# Patient Record
Sex: FEMALE | Race: WHITE | NOT HISPANIC OR LATINO | Employment: OTHER | URBAN - METROPOLITAN AREA
[De-identification: names, ages, dates, MRNs, and addresses within clinical notes are randomized per-mention and may not be internally consistent; named-entity substitution may affect disease eponyms.]

---

## 2019-02-05 ENCOUNTER — OFFICE VISIT (OUTPATIENT)
Dept: PAIN MEDICINE | Facility: CLINIC | Age: 54
End: 2019-02-05
Payer: MEDICAID

## 2019-02-05 ENCOUNTER — HOSPITAL ENCOUNTER (OUTPATIENT)
Dept: RADIOLOGY | Facility: HOSPITAL | Age: 54
Discharge: HOME | End: 2019-02-05
Attending: ANESTHESIOLOGY
Payer: MEDICAID

## 2019-02-05 VITALS
WEIGHT: 293 LBS | DIASTOLIC BLOOD PRESSURE: 89 MMHG | HEIGHT: 62 IN | SYSTOLIC BLOOD PRESSURE: 159 MMHG | HEART RATE: 93 BPM | BODY MASS INDEX: 53.92 KG/M2

## 2019-02-05 DIAGNOSIS — M17.0 PRIMARY OSTEOARTHRITIS OF BOTH KNEES: ICD-10-CM

## 2019-02-05 DIAGNOSIS — M19.019 SHOULDER ARTHRITIS: ICD-10-CM

## 2019-02-05 DIAGNOSIS — M47.816 LUMBAR SPONDYLOSIS: Primary | ICD-10-CM

## 2019-02-05 DIAGNOSIS — M47.816 LUMBAR FACET ARTHROPATHY: ICD-10-CM

## 2019-02-05 DIAGNOSIS — E66.01 MORBID OBESITY: ICD-10-CM

## 2019-02-05 DIAGNOSIS — M47.816 LUMBAR SPONDYLOSIS: ICD-10-CM

## 2019-02-05 PROCEDURE — 99999 PR PBB SHADOW E&M-NEW PATIENT-LVL III: ICD-10-PCS | Mod: PBBFAC,,, | Performed by: ANESTHESIOLOGY

## 2019-02-05 PROCEDURE — 72110 X-RAY EXAM L-2 SPINE 4/>VWS: CPT | Mod: TC

## 2019-02-05 PROCEDURE — 99204 OFFICE O/P NEW MOD 45 MIN: CPT | Mod: S$PBB,,, | Performed by: ANESTHESIOLOGY

## 2019-02-05 PROCEDURE — 72110 XR LUMBAR SPINE 5 VIEW WITH FLEX AND EXT: ICD-10-PCS | Mod: 26,,, | Performed by: RADIOLOGY

## 2019-02-05 PROCEDURE — 99999 PR PBB SHADOW E&M-NEW PATIENT-LVL III: CPT | Mod: PBBFAC,,, | Performed by: ANESTHESIOLOGY

## 2019-02-05 PROCEDURE — 99203 OFFICE O/P NEW LOW 30 MIN: CPT | Mod: PBBFAC,25,PN | Performed by: ANESTHESIOLOGY

## 2019-02-05 PROCEDURE — 73562 X-RAY EXAM OF KNEE 3: CPT | Mod: 26,50,, | Performed by: RADIOLOGY

## 2019-02-05 PROCEDURE — 73562 XR KNEE ORTHO BILAT: ICD-10-PCS | Mod: 26,50,, | Performed by: RADIOLOGY

## 2019-02-05 PROCEDURE — 72110 X-RAY EXAM L-2 SPINE 4/>VWS: CPT | Mod: 26,,, | Performed by: RADIOLOGY

## 2019-02-05 PROCEDURE — 73562 X-RAY EXAM OF KNEE 3: CPT | Mod: TC,50

## 2019-02-05 PROCEDURE — 99204 PR OFFICE/OUTPT VISIT, NEW, LEVL IV, 45-59 MIN: ICD-10-PCS | Mod: S$PBB,,, | Performed by: ANESTHESIOLOGY

## 2019-02-05 RX ORDER — METHOCARBAMOL 500 MG/1
500 TABLET, FILM COATED ORAL 2 TIMES DAILY PRN
Qty: 60 TABLET | Refills: 0 | Status: SHIPPED | OUTPATIENT
Start: 2019-02-05 | End: 2019-02-15

## 2019-02-05 NOTE — PROGRESS NOTES
Chief Pain Complaint:  Knee Pain; Low-back Pain; and Shoulder Pain        History of Present Illness:   Cee Mora is a 53 y.o. female  who is presenting with a chief complaint of Knee Pain; Low-back Pain; and Shoulder Pain  . The patient began experiencing this problem insidiously, and the pain has been gradually worsening over the past 5 year(s). The pain is described as throbbing, cramping, aching and heavy and is located in the bilateral lumbar spine . Pain is intermittent and lasts hours. The  pain is nonradiating. The patient rates her pain a 7 out of ten and interferes with activities of daily living a 6 out of ten. Pain is exacerbated by prolonged standing, and is improved by rest. Patient reports prior trauma, no prior spinal surgery     - pertinent negatives: No fever, No chills, No weight loss, No bladder dysfunction, No bowel dysfunction, No saddle anesthesia  - pertinent positives: generalized nonspecific Lower Extremity weakness bilaterally    - medications, other therapies tried (physical therapy, injections):     >> NSAIDs, Tylenol, Tramadol, Norco, Percocet, morphine, gabapentin and flexeril    >> Has previously undergone Physical Therapy    >> Has NOT previously undergone spinal injection/s      Imaging / Labs / Studies (reviewed on 2/5/2019):    Review of Systems:  CONSTITUTIONAL: patient denies any fever, chills, or weight loss  SKIN: patient denies any rash or itching  RESPIRATORY: patient denies having any shortness of breath  GASTROINTESTINAL: patient denies having any diarrhea, constipation, or bowel incontinence  GENITOURINARY: patient denies having any abnormal bladder function    MUSCULOSKELETAL:  - patient complains of the above noted pain/s (see chief pain complaint)    NEUROLOGICAL:   - pain as above  - strength in Lower extremities is intact, BILATERALLY  - sensation in Lower extremities is intact, BILATERALLY  - patient denies any loss of bowel or bladder control     "  PSYCHIATRIC: patient denies any change in mood    Other:  All other systems reviewed and are negative      Physical Exam:  BP (!) 159/89 (BP Location: Left arm, Patient Position: Sitting)   Pulse 93   Ht 5' 2" (1.575 m)   Wt (!) 242.7 kg (535 lb)   LMP  (LMP Unknown)   BMI 97.85 kg/m²  (reviewed on 2/5/2019)  General: Alert and oriented, in no apparent distress.  Gait: normal gait.  Skin: No rashes, No discoloration, No obvious lesions  HEENT: Normocephalic, atraumatic. Pupils equal and round.  Cardiovascular: Regular rate and rhythm , no significant peripheral edema present  Respiratory: Without audible wheezing, without use of accessory muscles of respiration.    Musculoskeletal:    Lumbar Spine    - Pain on flexion of lumbar spine Absent  - Straight Leg Raise:  Absent    - Pain on extension of lumbar spine Present  - TTP over the lumbar facet joints Present L5-S1   - Lumbar facet loading Present    -Pain on palpation over the SI joint  Absent  - CEM: Absent      Neuro:    Strength:  LE R/L: HF: 5/5, HE: 5/5, KF: 5/5; KE: 5/5; FE: 5/5; FF: 5/5    Extremity Reflexes: Brisk and symmetric throughout.      Extremity Sensory: Sensation to pinprick and temperature symmetric. Proprioception intact.      Psych:  Mood and affect is appropriate      Assessment:    Cee Mora is a 53 y.o. year old female who is presenting with     Encounter Diagnoses   Name Primary?    Lumbar spondylosis Yes    Lumbar facet arthropathy     Primary osteoarthritis of both knees        Plan:    1. Interventional: None for now.    2. Pharmacologic: Patient has been weaned of Morphine 30 mg PO Q day and Percocet.  Discourage the use of opioid pain medication for chronic aixal back pain. Compound cream prescribed for her wrist,  hands, knees. Robaxin 500 mg Po BID PRN pain.    3. Rehabilitative: Encouraged PT with pool therapy but patient has limited income.     4. Diagnostic: Lumbar Xray. Knee Xray.     5. Follow up: Follow-up " in about 4 weeks (around 3/5/2019).      20 minutes were spent in this encounter with more than 50% of the time used for counseling and review of the plan.  Imaging / studies reviewed, detailed above.  I discussed in detail the risks, benefits, and alternatives to any and all potential treatment options.  All questions and concerns were fully addressed today in clinic. Medical decision making moderate.    Thank you for the opportunity to assist in the care of this patient.    Best wishes,    Signed:    Desean Suero MD          Disclaimer:  This note may have been prepared using voice recognition software, it may have not been extensively proofed, as such there could be errors within the text such as sound alike errors.

## 2019-03-05 ENCOUNTER — OFFICE VISIT (OUTPATIENT)
Dept: PAIN MEDICINE | Facility: CLINIC | Age: 54
End: 2019-03-05
Payer: MEDICAID

## 2019-03-05 VITALS
BODY MASS INDEX: 53.92 KG/M2 | HEART RATE: 77 BPM | SYSTOLIC BLOOD PRESSURE: 163 MMHG | HEIGHT: 62 IN | DIASTOLIC BLOOD PRESSURE: 89 MMHG | WEIGHT: 293 LBS

## 2019-03-05 DIAGNOSIS — E11.42 DIABETIC POLYNEUROPATHY ASSOCIATED WITH TYPE 2 DIABETES MELLITUS: ICD-10-CM

## 2019-03-05 DIAGNOSIS — M47.816 LUMBAR SPONDYLOSIS: Primary | ICD-10-CM

## 2019-03-05 DIAGNOSIS — M47.816 LUMBAR FACET ARTHROPATHY: ICD-10-CM

## 2019-03-05 DIAGNOSIS — M17.0 PRIMARY OSTEOARTHRITIS OF BOTH KNEES: ICD-10-CM

## 2019-03-05 DIAGNOSIS — E66.01 MORBID OBESITY: ICD-10-CM

## 2019-03-05 PROCEDURE — 99999 PR PBB SHADOW E&M-EST. PATIENT-LVL II: ICD-10-PCS | Mod: PBBFAC,,, | Performed by: ANESTHESIOLOGY

## 2019-03-05 PROCEDURE — 99214 OFFICE O/P EST MOD 30 MIN: CPT | Mod: S$PBB,,, | Performed by: ANESTHESIOLOGY

## 2019-03-05 PROCEDURE — 99212 OFFICE O/P EST SF 10 MIN: CPT | Mod: PBBFAC,PN | Performed by: ANESTHESIOLOGY

## 2019-03-05 PROCEDURE — 99214 PR OFFICE/OUTPT VISIT, EST, LEVL IV, 30-39 MIN: ICD-10-PCS | Mod: S$PBB,,, | Performed by: ANESTHESIOLOGY

## 2019-03-05 PROCEDURE — 99999 PR PBB SHADOW E&M-EST. PATIENT-LVL II: CPT | Mod: PBBFAC,,, | Performed by: ANESTHESIOLOGY

## 2019-03-05 RX ORDER — GABAPENTIN 600 MG/1
TABLET ORAL
Qty: 120 TABLET | Refills: 2 | Status: SHIPPED | OUTPATIENT
Start: 2019-03-05 | End: 2019-06-10 | Stop reason: SDUPTHER

## 2019-03-05 RX ORDER — LIDOCAINE 50 MG/G
OINTMENT TOPICAL
Qty: 2 TUBE | Refills: 2 | Status: SHIPPED | OUTPATIENT
Start: 2019-03-05

## 2019-03-05 RX ORDER — METHOCARBAMOL 750 MG/1
750 TABLET, FILM COATED ORAL 2 TIMES DAILY PRN
Qty: 60 TABLET | Refills: 2 | Status: SHIPPED | OUTPATIENT
Start: 2019-03-05 | End: 2019-04-04

## 2019-03-05 NOTE — PROGRESS NOTES
Chief Pain Complaint:  Low-back Pain; Shoulder Pain; and Knee Pain    Interval History: The patient was last seen 2/5/2019. At that visit the plan was to start compound cream and Robaxin.  The patient reports that she is/was better following the procedure. The changes lasted 4 weeks. The changes have continued through this visit.      History of Present Illness:   Cee Mora is a 53 y.o. female  who is presenting with a chief complaint of Low-back Pain; Shoulder Pain; and Knee Pain  . The patient began experiencing this problem insidiously, and the pain has been gradually worsening over the past 5 year(s). The pain is described as throbbing, cramping, aching and heavy and is located in the bilateral lumbar spine . Pain is intermittent and lasts hours. The  pain is nonradiating. The patient rates her pain a 7 out of ten and interferes with activities of daily living a 6 out of ten. Pain is exacerbated by prolonged standing, and is improved by rest. Patient reports prior trauma, no prior spinal surgery     - pertinent negatives: No fever, No chills, No weight loss, No bladder dysfunction, No bowel dysfunction, No saddle anesthesia  - pertinent positives: generalized nonspecific Lower Extremity weakness bilaterally    - medications, other therapies tried (physical therapy, injections):     >> NSAIDs, Tylenol, Tramadol, Norco, Percocet, morphine, gabapentin and flexeril    >> Has previously undergone Physical Therapy    >> Has NOT previously undergone spinal injection/s      Imaging / Labs / Studies (reviewed on 3/5/2019):    Review of Systems:  CONSTITUTIONAL: patient denies any fever, chills, or weight loss  SKIN: patient denies any rash or itching  RESPIRATORY: patient denies having any shortness of breath  GASTROINTESTINAL: patient denies having any diarrhea, constipation, or bowel incontinence  GENITOURINARY: patient denies having any abnormal bladder function    MUSCULOSKELETAL:  - patient complains of the  "above noted pain/s (see chief pain complaint)    NEUROLOGICAL:   - pain as above  - strength in Lower extremities is intact, BILATERALLY  - sensation in Lower extremities is intact, BILATERALLY  - patient denies any loss of bowel or bladder control      PSYCHIATRIC: patient denies any change in mood    Other:  All other systems reviewed and are negative      Physical Exam:  BP (!) 163/89 (BP Location: Right arm, Patient Position: Sitting)   Pulse 77   Ht 5' 2" (1.575 m)   Wt (!) 242.7 kg (535 lb)   LMP  (LMP Unknown)   BMI 97.85 kg/m²  (reviewed on 3/5/2019)  General: Alert and oriented, in no apparent distress.  Gait: normal gait.  Skin: No rashes, No discoloration, No obvious lesions  HEENT: Normocephalic, atraumatic. Pupils equal and round.  Cardiovascular: Regular rate and rhythm , no significant peripheral edema present  Respiratory: Without audible wheezing, without use of accessory muscles of respiration.    Musculoskeletal:    Lumbar Spine    - Pain on flexion of lumbar spine Absent  - Straight Leg Raise:  Absent    - Pain on extension of lumbar spine Present  - TTP over the lumbar facet joints Present L5-S1   - Lumbar facet loading Present    -Pain on palpation over the SI joint  Absent  - CEM: Absent      Neuro:    Strength:  LE R/L: HF: 5/5, HE: 5/5, KF: 5/5; KE: 5/5; FE: 5/5; FF: 5/5    Extremity Reflexes: Brisk and symmetric throughout.      Extremity Sensory: Sensation to pinprick and temperature symmetric. Proprioception intact.      Psych:  Mood and affect is appropriate      Assessment:    Cee Mora is a 53 y.o. year old female who is presenting with     Encounter Diagnoses   Name Primary?    Lumbar spondylosis Yes    Lumbar facet arthropathy     Primary osteoarthritis of both knees     Morbid obesity     Diabetic polyneuropathy associated with type 2 diabetes mellitus        Plan:    1. Interventional: None for now.    2. Pharmacologic: Patient has been currently being weaning " down successfully from Morphine 30 mg PO BID and Percocet.  Discourage the use of opioid pain medication for chronic aixal back pain. Compound cream prescribed for her wrist,  hands, knees this is helping a great deal. Increase Robaxin 500 to 750 mg Po BID PRN pain. She is tolerating well. Increase Gabapentin 600 mg to TID to 600/600/1200 mg Po TID.    3. Rehabilitative: Encouraged PT with pool therapy but patient has limited income. Patient is consider bariatric surgery.      4. Diagnostic: Lumbar Xray and Knee Xray reviewed with patient.     5. Follow up: PRN.    20 minutes were spent in this encounter with more than 50% of the time used for counseling and review of the plan.  Imaging / studies reviewed, detailed above.  I discussed in detail the risks, benefits, and alternatives to any and all potential treatment options.  All questions and concerns were fully addressed today in clinic. Medical decision making moderate.    Thank you for the opportunity to assist in the care of this patient.    Best wishes,    Signed:    Desean Suero MD          Disclaimer:  This note may have been prepared using voice recognition software, it may have not been extensively proofed, as such there could be errors within the text such as sound alike errors.

## 2019-06-10 RX ORDER — GABAPENTIN 600 MG/1
TABLET ORAL
Qty: 120 TABLET | Refills: 2 | Status: SHIPPED | OUTPATIENT
Start: 2019-06-10 | End: 2019-09-06 | Stop reason: SDUPTHER

## 2019-09-06 RX ORDER — GABAPENTIN 600 MG/1
TABLET ORAL
Qty: 120 TABLET | Refills: 2 | Status: SHIPPED | OUTPATIENT
Start: 2019-09-06 | End: 2019-12-03 | Stop reason: SDUPTHER

## 2019-12-03 RX ORDER — GABAPENTIN 600 MG/1
TABLET ORAL
Qty: 120 TABLET | Refills: 2 | Status: SHIPPED | OUTPATIENT
Start: 2019-12-03 | End: 2020-02-27

## 2020-02-27 DIAGNOSIS — E11.42 DIABETIC POLYNEUROPATHY ASSOCIATED WITH TYPE 2 DIABETES MELLITUS: Primary | ICD-10-CM

## 2020-02-27 RX ORDER — GABAPENTIN 600 MG/1
TABLET ORAL
Qty: 120 TABLET | Refills: 0 | Status: SHIPPED | OUTPATIENT
Start: 2020-02-27 | End: 2020-03-23 | Stop reason: SDUPTHER

## 2020-03-02 RX ORDER — LIDOCAINE AND PRILOCAINE 25; 25 MG/G; MG/G
CREAM TOPICAL 2 TIMES DAILY PRN
Qty: 720 G | Refills: 2 | Status: SHIPPED | OUTPATIENT
Start: 2020-03-02 | End: 2021-08-04 | Stop reason: SDUPTHER

## 2020-03-02 RX ORDER — DICLOFENAC SODIUM 10 MG/G
GEL TOPICAL 3 TIMES DAILY PRN
Qty: 500 G | Refills: 0 | Status: SHIPPED | OUTPATIENT
Start: 2020-03-02 | End: 2021-08-04 | Stop reason: SDUPTHER

## 2020-03-23 ENCOUNTER — TELEPHONE (OUTPATIENT)
Dept: PAIN MEDICINE | Facility: CLINIC | Age: 55
End: 2020-03-23

## 2020-03-23 RX ORDER — GABAPENTIN 600 MG/1
TABLET ORAL
Qty: 120 TABLET | Refills: 2 | Status: SHIPPED | OUTPATIENT
Start: 2020-03-23 | End: 2020-07-27 | Stop reason: SDUPTHER

## 2020-03-23 NOTE — TELEPHONE ENCOUNTER
----- Message from Lindsay Tan sent at 3/23/2020  8:48 AM CDT -----  Contact: Pt  Type:  RX Refill Request    Who Called: Pt  Refill or New Rx:refill  RX Name and Strength: gabapentin (NEURONTIN) 600 MG tablet  How is the patient currently taking it? (ex. 1XDay): 4 per day  Is this a 30 day or 90 day RX: 30  Preferred Pharmacy with phone number: [facility] Phone [phone]  Local or Mail Order: Local  Ordering Provider: Pio  Would the patient rather a call back or a response via [facility]? Callback  Best Call Back Number: [phone]  Additional Information: Pt is requesting a refill on medication and a change in pharmacy

## 2020-03-23 NOTE — TELEPHONE ENCOUNTER
Rx sent to   [facility] - [place] - [address] [facility] [phone] (Phone)     Pt notified. All questions answered.//lp

## 2020-07-23 ENCOUNTER — TELEPHONE (OUTPATIENT)
Dept: PAIN MEDICINE | Facility: CLINIC | Age: 55
End: 2020-07-23

## 2020-07-23 ENCOUNTER — TELEPHONE (OUTPATIENT)
Dept: PHYSICAL MEDICINE AND REHAB | Facility: CLINIC | Age: 55
End: 2020-07-23

## 2020-07-23 NOTE — TELEPHONE ENCOUNTER
----- Message from Zuleyma Enrique sent at 7/23/2020  3:35 PM CDT -----  Pt called stating that she has set up my chart and needs to be scheduled for virtual visit please reach out to pt at [phone]

## 2020-07-23 NOTE — TELEPHONE ENCOUNTER
----- Message from Zenia Multani sent at 7/23/2020 10:03 AM CDT -----  Contact: pt  Pt called to request a refill on gabapentin (NEURONTIN) 600 MG tablet. Please send to pharmacy on file.

## 2020-07-24 ENCOUNTER — TELEPHONE (OUTPATIENT)
Dept: PAIN MEDICINE | Facility: CLINIC | Age: 55
End: 2020-07-24

## 2020-07-24 NOTE — TELEPHONE ENCOUNTER
----- Message from Meghna Sage LPN sent at 7/24/2020 12:00 PM CDT -----  Regarding: FW: pt  She needs a virtual visit with me next week.  We have Monday available.  Let me know and I can open slot.  ----- Message -----  From: Megan Sharif  Sent: 7/24/2020  10:37 AM CDT  To: [facility] Staff  Subject: pt                                               .Type:  Patient Returning Call    Who Called:Cee Mora  Who Left Message for Patient:Unsure   Does the patient know what this is regarding? Yes   Would the patient rather a call back or a response via [facility]? Call back   Best Call Back Number:.[phone] (home)   Additional Information:       Thank You ,   Megan Sharif

## 2020-07-27 ENCOUNTER — OFFICE VISIT (OUTPATIENT)
Dept: PAIN MEDICINE | Facility: CLINIC | Age: 55
End: 2020-07-27
Payer: MEDICAID

## 2020-07-27 DIAGNOSIS — E11.42 DIABETIC POLYNEUROPATHY ASSOCIATED WITH TYPE 2 DIABETES MELLITUS: ICD-10-CM

## 2020-07-27 DIAGNOSIS — M47.816 LUMBAR SPONDYLOSIS: Primary | ICD-10-CM

## 2020-07-27 DIAGNOSIS — M17.0 PRIMARY OSTEOARTHRITIS OF BOTH KNEES: ICD-10-CM

## 2020-07-27 DIAGNOSIS — M19.019 SHOULDER ARTHRITIS: ICD-10-CM

## 2020-07-27 PROCEDURE — 99214 PR OFFICE/OUTPT VISIT, EST, LEVL IV, 30-39 MIN: ICD-10-PCS | Mod: 95,,, | Performed by: PHYSICIAN ASSISTANT

## 2020-07-27 PROCEDURE — 99214 OFFICE O/P EST MOD 30 MIN: CPT | Mod: 95,,, | Performed by: PHYSICIAN ASSISTANT

## 2020-07-27 RX ORDER — GABAPENTIN 600 MG/1
600 TABLET ORAL 4 TIMES DAILY
Qty: 120 TABLET | Refills: 5 | Status: SHIPPED | OUTPATIENT
Start: 2020-07-27 | End: 2021-01-28

## 2020-07-27 RX ORDER — LISINOPRIL AND HYDROCHLOROTHIAZIDE 12.5; 2 MG/1; MG/1
0.5 TABLET ORAL DAILY
COMMUNITY

## 2020-07-27 RX ORDER — METHOCARBAMOL 750 MG/1
500 TABLET, FILM COATED ORAL 3 TIMES DAILY
COMMUNITY

## 2020-07-27 RX ORDER — AMITRIPTYLINE HYDROCHLORIDE 50 MG/1
50 TABLET, FILM COATED ORAL NIGHTLY
COMMUNITY

## 2020-07-27 RX ORDER — LEVOTHYROXINE SODIUM 100 UG/1
100 TABLET ORAL
COMMUNITY

## 2020-07-27 RX ORDER — BUDESONIDE AND FORMOTEROL FUMARATE DIHYDRATE 160; 4.5 UG/1; UG/1
2 AEROSOL RESPIRATORY (INHALATION) EVERY 12 HOURS
COMMUNITY

## 2020-07-27 RX ORDER — LIRAGLUTIDE 6 MG/ML
0.6 INJECTION SUBCUTANEOUS DAILY
COMMUNITY
End: 2023-06-29

## 2020-07-27 NOTE — PROGRESS NOTES
The patient location is: home  The chief complaint leading to consultation is: low back pain, knee pain    Visit type: audiovisual    Face to Face time with patient: 10-15 minutes  20 minutes of total time spent on the encounter, which includes face to face time and non-face to face time preparing to see the patient (eg, review of tests), Obtaining and/or reviewing separately obtained history, Documenting clinical information in the electronic or other health record, Independently interpreting results (not separately reported) and communicating results to the patient/family/caregiver, or Care coordination (not separately reported).     Each patient to whom he or she provides medical services by telemedicine is:  (1) informed of the relationship between the physician and patient and the respective role of any other health care provider with respect to management of the patient; and (2) notified that he or she may decline to receive medical services by telemedicine and may withdraw from such care at any time.      Chief Pain Complaint:  Back Pain    History of Present Illness:   Cee Mora is a 54 y.o. female  who is presenting with a chief complaint of Back Pain. The patient began experiencing this problem insidiously, and the pain has been gradually worsening over the past 5 year(s). The pain is described as throbbing, cramping, aching and heavy and is located in the bilateral lumbar spine . Pain is intermittent and lasts hours. The  pain is nonradiating. The patient rates her pain a 7 out of ten and interferes with activities of daily living a 6 out of ten. Pain is exacerbated by prolonged standing, and is improved by rest. Patient reports prior trauma, no prior spinal surgery     - pertinent negatives: No fever, No chills, No weight loss, No bladder dysfunction, No bowel dysfunction, No saddle anesthesia  - pertinent positives: generalized nonspecific Lower Extremity weakness bilaterally    - medications,  other therapies tried (physical therapy, injections):     >> NSAIDs, Tylenol, Tramadol, Norco, Percocet, morphine, gabapentin and flexeril    >> Has previously undergone Physical Therapy    >> Has NOT previously undergone spinal injection/s      Imaging / Labs / Studies (reviewed on 7/27/2020):  Results for orders placed during the hospital encounter of 02/05/19   X-Ray Lumbar Complete With Flex And Ext    Narrative COMPARISON:  None.  FINDINGS:  There is mild rightward convexity of the lumbar spine.  Transitional lumbosacral anatomy noted with partial sacralization of L5.  Vertebral body heights are well maintained.  There is slight grade 1 anterolisthesis of L4 on L5 which appears minimally worsened with flexion.  There is mild-to-moderate disc height loss throughout the lumbar spine with relative sparing of L3-4.  Prominent multilevel degenerative endplate spurring noted.  Multilevel facet arthropathy is present in the lower lumbar spine.  No pars defects visualized.  Posterior elements appear grossly intact. No acute fractures demonstrated.       Review of Systems:  CONSTITUTIONAL: patient denies any fever, chills, or weight loss  SKIN: patient denies any rash or itching  RESPIRATORY: patient denies having any shortness of breath  GASTROINTESTINAL: patient denies having any diarrhea, constipation, or bowel incontinence  GENITOURINARY: patient denies having any abnormal bladder function    MUSCULOSKELETAL:  - patient complains of the above noted pain/s (see chief pain complaint)    NEUROLOGICAL:   - pain as above  - strength in Lower extremities is intact, BILATERALLY  - sensation in Lower extremities is intact, BILATERALLY  - patient denies any loss of bowel or bladder control      PSYCHIATRIC: patient denies any change in mood    Other:  All other systems reviewed and are negative      Physical Exam:  Telemedicine Exam  GENERAL: Well appearing, in no acute distress, alert and oriented x3.    PSYCH:  Mood and  affect appropriate.  SKIN: Skin color, texture, turgor normal, no rashes or lesions.  HEAD/FACE:  Normocephalic, atraumatic. Cranial nerves grossly intact.  CV:  No peripheral edema noted  PULM:  No difficulty breathing  Neuro/MSK:  BACK: Palpation over the lumbar paraspinous muscles. pain with flexion, extension, or lateral flexion.  EXTREMITIES: Peripheral joint active ROM is full and pain free without obvious instability or laxity in all four extremities. No deformities, edema, or skin discoloration.   MUSCULOSKELETAL: No atrophy or tone abnormalities are noted.  GAIT: normal.        Physical Exam: last visit:   LMP  (LMP Unknown)  (reviewed on 7/27/2020)  General: Alert and oriented, in no apparent distress.  Gait: normal gait.  Skin: No rashes, No discoloration, No obvious lesions  HEENT: Normocephalic, atraumatic. Pupils equal and round.  Cardiovascular: Regular rate and rhythm , no significant peripheral edema present  Respiratory: Without audible wheezing, without use of accessory muscles of respiration.    Musculoskeletal:  Lumbar Spine  - Pain on flexion of lumbar spine Absent  - Straight Leg Raise:  Absent  - Pain on extension of lumbar spine Present  - TTP over the lumbar facet joints Present L5-S1   - Lumbar facet loading Present  -Pain on palpation over the SI joint  Absent  - CEM: Absent      Neuro:  Strength:  LE R/L: HF: 5/5, HE: 5/5, KF: 5/5; KE: 5/5; FE: 5/5; FF: 5/5  Extremity Reflexes: Brisk and symmetric throughout.  Extremity Sensory: Sensation to pinprick and temperature symmetric. Proprioception intact.  Psych:  Mood and affect is appropriate      Assessment:  Cee Mora is a 54 y.o. year old female who is presenting with     Encounter Diagnoses   Name Primary?    Lumbar spondylosis Yes    Primary osteoarthritis of both knees     Diabetic polyneuropathy associated with type 2 diabetes mellitus     Shoulder arthritis        Plan:  1. Interventional: None for now.    2.  Pharmacologic: Refill gabapentin 600mg qid with 5 refills.    3. Rehabilitative: Encouraged PT with pool therapy, but patient has limited income. Encouraged ambulation. Patient is considering bariatric surgery.      4. Diagnostic: None.    5. Follow up: 6 MONTHS.    - I discussed the risks, benefits, and alternatives to potential treatment options. All questions and concerns were fully addressed today in clinic.           Disclaimer:  This note was prepared using voice recognition system and is likely to have sound alike errors that may have been overlooked even after proof reading.  Please call me with any questions.

## 2021-01-26 ENCOUNTER — TELEPHONE (OUTPATIENT)
Dept: PAIN MEDICINE | Facility: CLINIC | Age: 56
End: 2021-01-26

## 2021-01-28 ENCOUNTER — OFFICE VISIT (OUTPATIENT)
Dept: PAIN MEDICINE | Facility: CLINIC | Age: 56
End: 2021-01-28
Payer: MEDICAID

## 2021-01-28 VITALS — BODY MASS INDEX: 53.92 KG/M2 | HEIGHT: 62 IN | RESPIRATION RATE: 17 BRPM | WEIGHT: 293 LBS

## 2021-01-28 DIAGNOSIS — M17.0 PRIMARY OSTEOARTHRITIS OF BOTH KNEES: ICD-10-CM

## 2021-01-28 DIAGNOSIS — M47.816 LUMBAR SPONDYLOSIS: Primary | ICD-10-CM

## 2021-01-28 DIAGNOSIS — M54.59 LUMBAR FACET JOINT PAIN: ICD-10-CM

## 2021-01-28 DIAGNOSIS — E11.42 DIABETIC POLYNEUROPATHY ASSOCIATED WITH TYPE 2 DIABETES MELLITUS: ICD-10-CM

## 2021-01-28 PROCEDURE — 99214 OFFICE O/P EST MOD 30 MIN: CPT | Mod: 95,,, | Performed by: PHYSICIAN ASSISTANT

## 2021-01-28 PROCEDURE — 99214 PR OFFICE/OUTPT VISIT, EST, LEVL IV, 30-39 MIN: ICD-10-PCS | Mod: 95,,, | Performed by: PHYSICIAN ASSISTANT

## 2021-01-28 RX ORDER — GABAPENTIN 600 MG/1
TABLET ORAL
Qty: 120 TABLET | Refills: 5 | Status: SHIPPED | OUTPATIENT
Start: 2021-01-28 | End: 2021-08-04 | Stop reason: SDUPTHER

## 2021-05-10 ENCOUNTER — PATIENT MESSAGE (OUTPATIENT)
Dept: RESEARCH | Facility: HOSPITAL | Age: 56
End: 2021-05-10

## 2021-08-04 ENCOUNTER — OFFICE VISIT (OUTPATIENT)
Dept: PAIN MEDICINE | Facility: CLINIC | Age: 56
End: 2021-08-04
Payer: MEDICAID

## 2021-08-04 VITALS — HEIGHT: 62 IN | BODY MASS INDEX: 53.92 KG/M2 | RESPIRATION RATE: 17 BRPM | WEIGHT: 293 LBS

## 2021-08-04 DIAGNOSIS — M54.59 LUMBAR FACET JOINT PAIN: ICD-10-CM

## 2021-08-04 DIAGNOSIS — E11.42 DIABETIC POLYNEUROPATHY ASSOCIATED WITH TYPE 2 DIABETES MELLITUS: Primary | ICD-10-CM

## 2021-08-04 DIAGNOSIS — M25.561 CHRONIC PAIN OF BOTH KNEES: ICD-10-CM

## 2021-08-04 DIAGNOSIS — G89.29 CHRONIC PAIN OF BOTH KNEES: ICD-10-CM

## 2021-08-04 DIAGNOSIS — M25.562 CHRONIC PAIN OF BOTH KNEES: ICD-10-CM

## 2021-08-04 PROCEDURE — 99214 OFFICE O/P EST MOD 30 MIN: CPT | Mod: 95,,, | Performed by: PHYSICIAN ASSISTANT

## 2021-08-04 PROCEDURE — 99214 PR OFFICE/OUTPT VISIT, EST, LEVL IV, 30-39 MIN: ICD-10-PCS | Mod: 95,,, | Performed by: PHYSICIAN ASSISTANT

## 2021-08-04 RX ORDER — DICLOFENAC SODIUM 10 MG/G
GEL TOPICAL 3 TIMES DAILY PRN
Qty: 500 G | Refills: 0 | Status: SHIPPED | OUTPATIENT
Start: 2021-08-04

## 2021-08-04 RX ORDER — LIDOCAINE AND PRILOCAINE 25; 25 MG/G; MG/G
CREAM TOPICAL
Qty: 720 G | Refills: 2 | Status: SHIPPED | OUTPATIENT
Start: 2021-08-04 | End: 2022-08-10

## 2021-08-04 RX ORDER — GABAPENTIN 600 MG/1
600 TABLET ORAL 4 TIMES DAILY
Qty: 120 TABLET | Refills: 5 | Status: SHIPPED | OUTPATIENT
Start: 2021-08-04 | End: 2022-10-19 | Stop reason: SDUPTHER

## 2021-08-12 ENCOUNTER — TELEPHONE (OUTPATIENT)
Dept: PAIN MEDICINE | Facility: CLINIC | Age: 56
End: 2021-08-12

## 2021-08-16 ENCOUNTER — TELEPHONE (OUTPATIENT)
Dept: PAIN MEDICINE | Facility: CLINIC | Age: 56
End: 2021-08-16

## 2021-09-29 ENCOUNTER — PATIENT MESSAGE (OUTPATIENT)
Dept: PAIN MEDICINE | Facility: CLINIC | Age: 56
End: 2021-09-29

## 2021-09-29 ENCOUNTER — TELEPHONE (OUTPATIENT)
Dept: PAIN MEDICINE | Facility: CLINIC | Age: 56
End: 2021-09-29

## 2021-10-20 ENCOUNTER — TELEPHONE (OUTPATIENT)
Dept: PAIN MEDICINE | Facility: CLINIC | Age: 56
End: 2021-10-20

## 2021-10-21 ENCOUNTER — HOSPITAL ENCOUNTER (OUTPATIENT)
Facility: HOSPITAL | Age: 56
Discharge: HOME | End: 2021-10-21
Attending: ANESTHESIOLOGY | Admitting: ANESTHESIOLOGY
Payer: MEDICAID

## 2021-10-21 VITALS
SYSTOLIC BLOOD PRESSURE: 120 MMHG | OXYGEN SATURATION: 94 % | DIASTOLIC BLOOD PRESSURE: 56 MMHG | WEIGHT: 293 LBS | RESPIRATION RATE: 18 BRPM | HEIGHT: 62 IN | TEMPERATURE: 98 F | HEART RATE: 93 BPM | BODY MASS INDEX: 53.92 KG/M2

## 2021-10-21 DIAGNOSIS — G89.29 CHRONIC KNEE PAIN: Primary | ICD-10-CM

## 2021-10-21 DIAGNOSIS — M25.569 CHRONIC KNEE PAIN: Primary | ICD-10-CM

## 2021-10-21 LAB — POCT GLUCOSE: 231 MG/DL (ref 70–110)

## 2021-10-21 PROCEDURE — 20610 DRAIN/INJ JOINT/BURSA W/O US: CPT | Mod: 50 | Performed by: ANESTHESIOLOGY

## 2021-10-21 PROCEDURE — 25000003 PHARM REV CODE 250: Performed by: ANESTHESIOLOGY

## 2021-10-21 PROCEDURE — 77002 NEEDLE LOCALIZATION BY XRAY: CPT | Mod: 26,,, | Performed by: ANESTHESIOLOGY

## 2021-10-21 PROCEDURE — 77002 PR FLUOROSCOPIC GUIDANCE NEEDLE PLACEMENT: ICD-10-PCS | Mod: 26,,, | Performed by: ANESTHESIOLOGY

## 2021-10-21 PROCEDURE — 82962 GLUCOSE BLOOD TEST: CPT | Performed by: ANESTHESIOLOGY

## 2021-10-21 PROCEDURE — 20610 PR DRAIN/INJECT LARGE JOINT/BURSA: ICD-10-PCS | Mod: 50,,, | Performed by: ANESTHESIOLOGY

## 2021-10-21 PROCEDURE — 20610 DRAIN/INJ JOINT/BURSA W/O US: CPT | Mod: 50,,, | Performed by: ANESTHESIOLOGY

## 2021-10-21 PROCEDURE — 25500020 PHARM REV CODE 255: Performed by: ANESTHESIOLOGY

## 2021-10-21 PROCEDURE — 63600175 PHARM REV CODE 636 W HCPCS: Performed by: ANESTHESIOLOGY

## 2021-10-21 RX ORDER — METHYLPREDNISOLONE ACETATE 40 MG/ML
INJECTION, SUSPENSION INTRA-ARTICULAR; INTRALESIONAL; INTRAMUSCULAR; SOFT TISSUE
Status: DISCONTINUED | OUTPATIENT
Start: 2021-10-21 | End: 2021-10-21 | Stop reason: HOSPADM

## 2021-10-21 RX ORDER — LIDOCAINE HYDROCHLORIDE 20 MG/ML
INJECTION, SOLUTION EPIDURAL; INFILTRATION; INTRACAUDAL; PERINEURAL
Status: DISCONTINUED | OUTPATIENT
Start: 2021-10-21 | End: 2021-10-21 | Stop reason: HOSPADM

## 2021-10-21 RX ORDER — INDOMETHACIN 25 MG/1
CAPSULE ORAL
Status: DISCONTINUED | OUTPATIENT
Start: 2021-10-21 | End: 2021-10-21 | Stop reason: HOSPADM

## 2022-01-21 RX ORDER — GABAPENTIN 600 MG/1
600 TABLET ORAL 4 TIMES DAILY
Qty: 120 TABLET | Refills: 0 | Status: SHIPPED | OUTPATIENT
Start: 2022-01-21 | End: 2022-02-04 | Stop reason: SDUPTHER

## 2022-01-21 RX ORDER — GABAPENTIN 600 MG/1
TABLET ORAL
Qty: 120 TABLET | Refills: 5 | OUTPATIENT
Start: 2022-01-21

## 2022-02-03 NOTE — PROGRESS NOTES
The patient location is: home  The chief complaint leading to consultation is: low back pain, knee pain    Visit type: audiovisual    Face to Face time with patient: 10-15 minutes  20 minutes of total time spent on the encounter, which includes face to face time and non-face to face time preparing to see the patient (eg, review of tests), Obtaining and/or reviewing separately obtained history, Documenting clinical information in the electronic or other health record, Independently interpreting results (not separately reported) and communicating results to the patient/family/caregiver, or Care coordination (not separately reported).     Each patient to whom he or she provides medical services by telemedicine is:  (1) informed of the relationship between the physician and patient and the respective role of any other health care provider with respect to management of the patient; and (2) notified that he or she may decline to receive medical services by telemedicine and may withdraw from such care at any time.      Chief Pain Complaint:  Knee Pain  L>R     Interval History (2/4/2022): Cee Mora presents today for follow-up visit.  she underwent bilateral knee injection on 10/21/21.  The patient reports that she is/was better following the procedure.  she reports 95% pain relief.  The changes lasted 3 weeks for left knee pain, but still better than it was prior to injection.  The changes have continued through this visit.  Patient reports pain as 6/10 today. Overall, pain significantly improved.     Interval History (8/4/2021):  Cee Mora presents today on telemedicine visit.  She was seen about 6 months ago.  Her knee pain has been worsening lately. She has never had prior treatment for knee pain.  She saw a dietician on 7/19/21, and she has been eating much healthier lately. Patient reports pain as 6/10 today.    Interval History (1/28/2021): Patient was last seen 6 months ago. She reports her low back  pain has been more bothersome lately, which she feels is due to weather change. She has continued to try to lose weight and is hoping to have bariatric surgery soon.  She has gone down from 535 to 389lb in 1.5-2 years.   Her BMI has reduced from 99 --> 71. Patient reports pain as 6/10 today.  Gabapentin is providing adequate pain relief.     History of Present Illness:   Cee Mora is a 56 y.o. female  who is presenting with a chief complaint of Back Pain. The patient began experiencing this problem insidiously, and the pain has been gradually worsening. The pain is described as throbbing, cramping, aching and heavy and is located in the bilateral lumbar spine . Pain is intermittent and lasts hours. The  pain is nonradiating. The patient rates her pain a 7 out of ten and interferes with activities of daily living a 6 out of ten. Pain is exacerbated by prolonged standing, and is improved by rest. Patient reports prior trauma, no prior spinal surgery     - pertinent negatives: No fever, No chills, No weight loss, No bladder dysfunction, No bowel dysfunction, No saddle anesthesia  - pertinent positives: generalized nonspecific Lower Extremity weakness bilaterally    - medications, other therapies tried (physical therapy, injections):     >> NSAIDs, Tylenol, Tramadol, Norco, Percocet, morphine, gabapentin and flexeril    >> Has previously undergone Physical Therapy    >> Injections:    - bilateral knee injection on 10/21/21 with 95% pain relief -- pain started to return about 3 weeks post for left knee, but still reporting pain relief 3 months post      Imaging / Labs / Studies (reviewed on 2/4/2022):  Results for orders placed during the hospital encounter of 02/05/19   X-Ray Lumbar Complete With Flex And Ext    Narrative COMPARISON:  None.  FINDINGS:  There is mild rightward convexity of the lumbar spine.  Transitional lumbosacral anatomy noted with partial sacralization of L5.  Vertebral body heights are well  "maintained.  There is slight grade 1 anterolisthesis of L4 on L5 which appears minimally worsened with flexion.  There is mild-to-moderate disc height loss throughout the lumbar spine with relative sparing of L3-4.  Prominent multilevel degenerative endplate spurring noted.  Multilevel facet arthropathy is present in the lower lumbar spine.  No pars defects visualized.  Posterior elements appear grossly intact. No acute fractures demonstrated.       Review of Systems:  CONSTITUTIONAL: patient denies any fever, chills, or weight loss  SKIN: patient denies any rash or itching  RESPIRATORY: patient denies having any shortness of breath  GASTROINTESTINAL: patient denies having any diarrhea, constipation, or bowel incontinence  GENITOURINARY: patient denies having any abnormal bladder function    MUSCULOSKELETAL:  - patient complains of the above noted pain/s (see chief pain complaint)    NEUROLOGICAL:   - pain as above  - strength in Lower extremities is intact, BILATERALLY  - sensation in Lower extremities is intact, BILATERALLY  - patient denies any loss of bowel or bladder control      PSYCHIATRIC: patient denies any change in mood    Other:  All other systems reviewed and are negative      Physical Exam:  Telemedicine Exam  Vitals:    02/04/22 1238   Resp: 17   Weight: (!) 179 kg (394 lb 10 oz)  Comment: pt reported   Height: 5' 2" (1.575 m)  Comment: pt reported   Body mass index is 72.18 kg/m².  (reviewed on 2/4/2022)    GENERAL: Well appearing, in no acute distress, alert and oriented x3.  obese.   PSYCH:  Mood and affect appropriate.  SKIN: Skin color, texture, turgor normal, no rashes or lesions.  HEAD/FACE:  Normocephalic, atraumatic. Cranial nerves grossly intact.  CV:  No peripheral edema noted  PULM:  No difficulty breathing  EXTREMITIES: Peripheral joint active ROM is full and pain free without obvious instability or laxity in all four extremities- with exception of knees. No deformities, edema, or skin " discoloration.  ROM of knees improved since injection.  MUSCULOSKELETAL: No atrophy or tone abnormalities are noted. Pain with extension and flexion of knees bilaterally.   GAIT: sitting.        Physical Exam: last visit:   General: Alert and oriented, in no apparent distress.  Gait: antalgic gait.  Skin: No rashes, No discoloration, No obvious lesions  HEENT: Normocephalic, atraumatic. Pupils equal and round.  Cardiovascular: Regular rate and rhythm , no significant peripheral edema present  Respiratory: Without audible wheezing, without use of accessory muscles of respiration.    Musculoskeletal:  Lumbar Spine  - Pain on flexion of lumbar spine Absent  - Straight Leg Raise:  Absent  - Pain on extension of lumbar spine Present  - TTP over the lumbar facet joints Present L5-S1   - Lumbar facet loading Present  -Pain on palpation over the SI joint  Absent  - CEM: Absent      Neuro:  Strength:  LE R/L: HF: 5/5, HE: 5/5, KF: 5/5; KE: 5/5; FE: 5/5; FF: 5/5  Extremity Reflexes: Brisk and symmetric throughout.  Extremity Sensory: Sensation to pinprick and temperature symmetric. Proprioception intact.  Psych:  Mood and affect is appropriate      Assessment:  Cee Mora is a 56 y.o. year old female who is presenting with       ICD-10-CM ICD-9-CM    1. Chronic pain of both knees  M25.561 719.46     M25.562 338.29     G89.29     2. Primary osteoarthritis of both knees  M17.0 715.16    3. Lumbar spondylosis  M47.816 721.3      Plan:  1. Interventional: S/p bilateral knee injection on 10/21/21 with 95% pain relief -- pain started to return about 3 weeks post for left knee, but still reporting pain relief 3 months post.      2. Pharmacologic:   - Continue diclofenac 1% gel to apply 2g topically up to 4 times per day. Continue lidocaine 2.5%-prilocaine 2.5% topical cream Q6H PRN topically; can alternate with Voltaren gel.   - Refill gabapentin 600mg QID with 5 refills.    3. Rehabilitative: Encouraged regular exercise.  Continue exercises and activities as tolerated.     4. Diagnostic: None.    5. Follow up: 6 months for regular medication Refill    - I discussed the risks, benefits, and alternatives to potential treatment options. All questions and concerns were fully addressed today in clinic.           Disclaimer:  This note was prepared using voice recognition system and is likely to have sound alike errors that may have been overlooked even after proof reading.  Please call me with any questions.

## 2022-02-04 ENCOUNTER — OFFICE VISIT (OUTPATIENT)
Dept: PAIN MEDICINE | Facility: CLINIC | Age: 57
End: 2022-02-04
Payer: MEDICAID

## 2022-02-04 VITALS — BODY MASS INDEX: 53.92 KG/M2 | WEIGHT: 293 LBS | RESPIRATION RATE: 17 BRPM | HEIGHT: 62 IN

## 2022-02-04 DIAGNOSIS — M17.0 PRIMARY OSTEOARTHRITIS OF BOTH KNEES: ICD-10-CM

## 2022-02-04 DIAGNOSIS — M47.816 LUMBAR SPONDYLOSIS: ICD-10-CM

## 2022-02-04 DIAGNOSIS — G89.29 CHRONIC PAIN OF BOTH KNEES: Primary | ICD-10-CM

## 2022-02-04 DIAGNOSIS — M25.562 CHRONIC PAIN OF BOTH KNEES: Primary | ICD-10-CM

## 2022-02-04 DIAGNOSIS — M25.561 CHRONIC PAIN OF BOTH KNEES: Primary | ICD-10-CM

## 2022-02-04 PROCEDURE — 4010F PR ACE/ARB THEARPY RXD/TAKEN: ICD-10-PCS | Mod: CPTII,95,, | Performed by: PHYSICIAN ASSISTANT

## 2022-02-04 PROCEDURE — 3008F BODY MASS INDEX DOCD: CPT | Mod: CPTII,95,, | Performed by: PHYSICIAN ASSISTANT

## 2022-02-04 PROCEDURE — 99214 OFFICE O/P EST MOD 30 MIN: CPT | Mod: 95,,, | Performed by: PHYSICIAN ASSISTANT

## 2022-02-04 PROCEDURE — 4010F ACE/ARB THERAPY RXD/TAKEN: CPT | Mod: CPTII,95,, | Performed by: PHYSICIAN ASSISTANT

## 2022-02-04 PROCEDURE — 1159F MED LIST DOCD IN RCRD: CPT | Mod: CPTII,95,, | Performed by: PHYSICIAN ASSISTANT

## 2022-02-04 PROCEDURE — 99214 PR OFFICE/OUTPT VISIT, EST, LEVL IV, 30-39 MIN: ICD-10-PCS | Mod: 95,,, | Performed by: PHYSICIAN ASSISTANT

## 2022-02-04 PROCEDURE — 1159F PR MEDICATION LIST DOCUMENTED IN MEDICAL RECORD: ICD-10-PCS | Mod: CPTII,95,, | Performed by: PHYSICIAN ASSISTANT

## 2022-02-04 PROCEDURE — 1160F PR REVIEW ALL MEDS BY PRESCRIBER/CLIN PHARMACIST DOCUMENTED: ICD-10-PCS | Mod: CPTII,95,, | Performed by: PHYSICIAN ASSISTANT

## 2022-02-04 PROCEDURE — 3008F PR BODY MASS INDEX (BMI) DOCUMENTED: ICD-10-PCS | Mod: CPTII,95,, | Performed by: PHYSICIAN ASSISTANT

## 2022-02-04 PROCEDURE — 1160F RVW MEDS BY RX/DR IN RCRD: CPT | Mod: CPTII,95,, | Performed by: PHYSICIAN ASSISTANT

## 2022-02-04 RX ORDER — GABAPENTIN 600 MG/1
600 TABLET ORAL 4 TIMES DAILY
Qty: 120 TABLET | Refills: 5 | Status: SHIPPED | OUTPATIENT
Start: 2022-02-04 | End: 2022-08-10 | Stop reason: SDUPTHER

## 2022-08-10 ENCOUNTER — OFFICE VISIT (OUTPATIENT)
Dept: PAIN MEDICINE | Facility: CLINIC | Age: 57
End: 2022-08-10
Payer: MEDICAID

## 2022-08-10 VITALS — RESPIRATION RATE: 17 BRPM

## 2022-08-10 DIAGNOSIS — M25.561 CHRONIC PAIN OF BOTH KNEES: Primary | ICD-10-CM

## 2022-08-10 DIAGNOSIS — M47.816 LUMBAR SPONDYLOSIS: ICD-10-CM

## 2022-08-10 DIAGNOSIS — G89.29 CHRONIC PAIN OF BOTH KNEES: Primary | ICD-10-CM

## 2022-08-10 DIAGNOSIS — M25.562 CHRONIC PAIN OF BOTH KNEES: Primary | ICD-10-CM

## 2022-08-10 DIAGNOSIS — E11.42 DIABETIC POLYNEUROPATHY ASSOCIATED WITH TYPE 2 DIABETES MELLITUS: ICD-10-CM

## 2022-08-10 PROCEDURE — 1159F PR MEDICATION LIST DOCUMENTED IN MEDICAL RECORD: ICD-10-PCS | Mod: CPTII,95,, | Performed by: PHYSICIAN ASSISTANT

## 2022-08-10 PROCEDURE — 4010F PR ACE/ARB THEARPY RXD/TAKEN: ICD-10-PCS | Mod: CPTII,95,, | Performed by: PHYSICIAN ASSISTANT

## 2022-08-10 PROCEDURE — 4010F ACE/ARB THERAPY RXD/TAKEN: CPT | Mod: CPTII,95,, | Performed by: PHYSICIAN ASSISTANT

## 2022-08-10 PROCEDURE — 99214 PR OFFICE/OUTPT VISIT, EST, LEVL IV, 30-39 MIN: ICD-10-PCS | Mod: 95,,, | Performed by: PHYSICIAN ASSISTANT

## 2022-08-10 PROCEDURE — 1159F MED LIST DOCD IN RCRD: CPT | Mod: CPTII,95,, | Performed by: PHYSICIAN ASSISTANT

## 2022-08-10 PROCEDURE — 1160F RVW MEDS BY RX/DR IN RCRD: CPT | Mod: CPTII,95,, | Performed by: PHYSICIAN ASSISTANT

## 2022-08-10 PROCEDURE — 1160F PR REVIEW ALL MEDS BY PRESCRIBER/CLIN PHARMACIST DOCUMENTED: ICD-10-PCS | Mod: CPTII,95,, | Performed by: PHYSICIAN ASSISTANT

## 2022-08-10 PROCEDURE — 99214 OFFICE O/P EST MOD 30 MIN: CPT | Mod: 95,,, | Performed by: PHYSICIAN ASSISTANT

## 2022-08-10 NOTE — PROGRESS NOTES
The patient location is: home  The chief complaint leading to consultation is: low back pain, knee pain    Visit type: audiovisual    Face to Face time with patient: 10-15 minutes  20 minutes of total time spent on the encounter, which includes face to face time and non-face to face time preparing to see the patient (eg, review of tests), Obtaining and/or reviewing separately obtained history, Documenting clinical information in the electronic or other health record, Independently interpreting results (not separately reported) and communicating results to the patient/family/caregiver, or Care coordination (not separately reported).     Each patient to whom he or she provides medical services by telemedicine is:  (1) informed of the relationship between the physician and patient and the respective role of any other health care provider with respect to management of the patient; and (2) notified that he or she may decline to receive medical services by telemedicine and may withdraw from such care at any time.      Chief Pain Complaint:  Knee pain, L>R     Interval History (8/10/2022):  Cee Mora presents today for follow-up visit.  Patient was last seen about 6 months ago. Patient reports pain as 5/10 today. Left knee started to bother her, but not to level that it was before. Seeing GI now due to potential gastroparesis - diagnosed in ER. She is awaiting gastric sleeve but has to get this taken care of before. She has lost 100 lb since this journey.    Interval History (2/4/2022): Cee Mora presents today for follow-up visit.  she underwent bilateral knee injection on 10/21/21.  The patient reports that she is/was better following the procedure.  she reports 95% pain relief.  The changes lasted 3 weeks for left knee pain, but still better than it was prior to injection.  The changes have continued through this visit.  Patient reports pain as 6/10 today. Overall, pain significantly improved.     Interval  History (8/4/2021):  Cee Mora presents today on telemedicine visit.  She was seen about 6 months ago.  Her knee pain has been worsening lately. She has never had prior treatment for knee pain.  She saw a dietician on 7/19/21, and she has been eating much healthier lately. Patient reports pain as 6/10 today.    Interval History (1/28/2021): Patient was last seen 6 months ago. She reports her low back pain has been more bothersome lately, which she feels is due to weather change. She has continued to try to lose weight and is hoping to have bariatric surgery soon.  She has gone down from 535 to 389lb in 1.5-2 years.   Her BMI has reduced from 99 --> 71. Patient reports pain as 6/10 today.  Gabapentin is providing adequate pain relief.     History of Present Illness:   Cee Mora is a 56 y.o. female  who is presenting with a chief complaint of Back Pain. The patient began experiencing this problem insidiously, and the pain has been gradually worsening. The pain is described as throbbing, cramping, aching and heavy and is located in the bilateral lumbar spine . Pain is intermittent and lasts hours. The  pain is nonradiating. The patient rates her pain a 7 out of ten and interferes with activities of daily living a 6 out of ten. Pain is exacerbated by prolonged standing, and is improved by rest. Patient reports prior trauma, no prior spinal surgery     - pertinent negatives: No fever, No chills, No weight loss, No bladder dysfunction, No bowel dysfunction, No saddle anesthesia  - pertinent positives: generalized nonspecific Lower Extremity weakness bilaterally    - medications, other therapies tried (physical therapy, injections):     >> NSAIDs, Tylenol, Tramadol, Norco, Percocet, morphine, gabapentin and flexeril    >> Has previously undergone Physical Therapy    >> Injections:    - bilateral knee injection on 10/21/21 with 95% pain relief -- pain started to return about 3 weeks post for left knee, but  still reporting pain relief 3 months post      Imaging / Labs / Studies (reviewed on 8/10/2022):  Results for orders placed during the hospital encounter of 02/05/19   X-Ray Lumbar Complete With Flex And Ext    Narrative COMPARISON:  None.  FINDINGS:  There is mild rightward convexity of the lumbar spine.  Transitional lumbosacral anatomy noted with partial sacralization of L5.  Vertebral body heights are well maintained.  There is slight grade 1 anterolisthesis of L4 on L5 which appears minimally worsened with flexion.  There is mild-to-moderate disc height loss throughout the lumbar spine with relative sparing of L3-4.  Prominent multilevel degenerative endplate spurring noted.  Multilevel facet arthropathy is present in the lower lumbar spine.  No pars defects visualized.  Posterior elements appear grossly intact. No acute fractures demonstrated.       Review of Systems:  CONSTITUTIONAL: patient denies any fever, chills, or weight loss  SKIN: patient denies any rash or itching  RESPIRATORY: patient denies having any shortness of breath  GASTROINTESTINAL: patient denies having any diarrhea, constipation, or bowel incontinence  GENITOURINARY: patient denies having any abnormal bladder function    MUSCULOSKELETAL:  - patient complains of the above noted pain/s (see chief pain complaint)    NEUROLOGICAL:   - pain as above  - strength in Lower extremities is intact, BILATERALLY  - sensation in Lower extremities is intact, BILATERALLY  - patient denies any loss of bowel or bladder control      PSYCHIATRIC: patient denies any change in mood    Other:  All other systems reviewed and are negative      Physical Exam:  Telemedicine Exam  Vitals:    08/10/22 1224   Resp: 17   There is no height or weight on file to calculate BMI.  (reviewed on 8/10/2022)    GENERAL: Well appearing, in no acute distress, alert and oriented x3.  obese.   PSYCH:  Mood and affect appropriate.  SKIN: Skin color, texture, turgor normal, no rashes  or lesions.  HEAD/FACE:  Normocephalic, atraumatic. Cranial nerves grossly intact.  PULM:  No difficulty breathing  EXTREMITIES: Peripheral joint active ROM is full and pain free without obvious instability or laxity in all four extremities- with exception of knees. No deformities, edema, or skin discoloration.  ROM of knees improved since injection.  MUSCULOSKELETAL: No atrophy or tone abnormalities are noted. Pain with extension and flexion of knees bilaterally.   GAIT: sitting in wheelchair.     Physical Exam: last visit:   General: Alert and oriented, in no apparent distress.  Gait: antalgic gait.  Skin: No rashes, No discoloration, No obvious lesions  HEENT: Normocephalic, atraumatic. Pupils equal and round.  Cardiovascular: Regular rate and rhythm , no significant peripheral edema present  Respiratory: Without audible wheezing, without use of accessory muscles of respiration.    Musculoskeletal:  Lumbar Spine  - Pain on flexion of lumbar spine Absent  - Straight Leg Raise:  Absent  - Pain on extension of lumbar spine Present  - TTP over the lumbar facet joints Present L5-S1   - Lumbar facet loading Present  -Pain on palpation over the SI joint  Absent  - CEM: Absent      Neuro:  Strength:  LE R/L: HF: 5/5, HE: 5/5, KF: 5/5; KE: 5/5; FE: 5/5; FF: 5/5  Extremity Reflexes: Brisk and symmetric throughout.  Extremity Sensory: Sensation to pinprick and temperature symmetric. Proprioception intact.  Psych:  Mood and affect is appropriate      Assessment:  Cee Mora is a 56 y.o. year old female who is presenting with       ICD-10-CM ICD-9-CM    1. Chronic pain of both knees  M25.561 719.46     M25.562 338.29     G89.29     2. Lumbar spondylosis  M47.816 721.3    3. Diabetic polyneuropathy associated with type 2 diabetes mellitus  E11.42 250.60      357.2      Plan:  1. Interventional: S/p bilateral knee injection on 10/21/21 with 95% pain relief -- pain started to return about 3 weeks post for left knee, but  still reporting pain relief 3 months post.      2. Pharmacologic:   - Continue diclofenac 1% gel to apply 2g topically up to 4 times per day. Continue lidocaine 2.5%-prilocaine 2.5% topical cream Q6H PRN topically; can alternate with Voltaren gel.   - Refill gabapentin 600mg QID with 5 refills.  - Continue Robaxin 750mg PRN - from PCP.    3. Rehabilitative: Encouraged regular exercise. Continue exercises and activities as tolerated.     4. Diagnostic: None.    5. Follow up: 6 months for regular medication Refill - virtual visit     - This condition does not require this patient to take time off of work, and the primary goal of our Pain Management services is to improve the patient's functional capacity.   - I discussed the risks, benefits, and alternatives to potential treatment options. All questions and concerns were fully addressed today in clinic.           Disclaimer:  This note was prepared using voice recognition system and is likely to have sound alike errors that may have been overlooked even after proof reading.  Please call me with any questions.

## 2023-01-09 ENCOUNTER — PATIENT MESSAGE (OUTPATIENT)
Dept: PAIN MEDICINE | Facility: CLINIC | Age: 58
End: 2023-01-09
Payer: MEDICAID

## 2023-01-16 ENCOUNTER — PATIENT MESSAGE (OUTPATIENT)
Dept: PAIN MEDICINE | Facility: CLINIC | Age: 58
End: 2023-01-16
Payer: MEDICAID

## 2023-01-17 ENCOUNTER — TELEPHONE (OUTPATIENT)
Dept: PAIN MEDICINE | Facility: CLINIC | Age: 58
End: 2023-01-17
Payer: MEDICAID

## 2023-01-17 NOTE — TELEPHONE ENCOUNTER
----- Message from Tahira Parada sent at 1/17/2023 11:51 AM CST -----  [facility] is requesting a call back in regards to getting an verbal on an Rx that was called in for pt. Caller states that the medication that was sent over is not cover by pt pharmacy. Caller can be reached at [phone]

## 2023-02-03 ENCOUNTER — OFFICE VISIT (OUTPATIENT)
Dept: PAIN MEDICINE | Facility: CLINIC | Age: 58
End: 2023-02-03
Payer: MEDICAID

## 2023-02-03 VITALS — RESPIRATION RATE: 17 BRPM | HEIGHT: 62 IN | WEIGHT: 293 LBS | BODY MASS INDEX: 53.92 KG/M2

## 2023-02-03 DIAGNOSIS — M47.816 LUMBAR SPONDYLOSIS: ICD-10-CM

## 2023-02-03 DIAGNOSIS — M25.561 CHRONIC PAIN OF BOTH KNEES: Primary | ICD-10-CM

## 2023-02-03 DIAGNOSIS — G89.29 CHRONIC PAIN OF BOTH KNEES: Primary | ICD-10-CM

## 2023-02-03 DIAGNOSIS — E11.42 DIABETIC POLYNEUROPATHY ASSOCIATED WITH TYPE 2 DIABETES MELLITUS: ICD-10-CM

## 2023-02-03 DIAGNOSIS — M25.562 CHRONIC PAIN OF BOTH KNEES: Primary | ICD-10-CM

## 2023-02-03 DIAGNOSIS — M17.0 PRIMARY OSTEOARTHRITIS OF BOTH KNEES: ICD-10-CM

## 2023-02-03 PROCEDURE — 99214 PR OFFICE/OUTPT VISIT, EST, LEVL IV, 30-39 MIN: ICD-10-PCS | Mod: 95,,, | Performed by: PHYSICIAN ASSISTANT

## 2023-02-03 PROCEDURE — 3008F PR BODY MASS INDEX (BMI) DOCUMENTED: ICD-10-PCS | Mod: CPTII,95,, | Performed by: PHYSICIAN ASSISTANT

## 2023-02-03 PROCEDURE — 1160F PR REVIEW ALL MEDS BY PRESCRIBER/CLIN PHARMACIST DOCUMENTED: ICD-10-PCS | Mod: CPTII,95,, | Performed by: PHYSICIAN ASSISTANT

## 2023-02-03 PROCEDURE — 4010F ACE/ARB THERAPY RXD/TAKEN: CPT | Mod: CPTII,95,, | Performed by: PHYSICIAN ASSISTANT

## 2023-02-03 PROCEDURE — 3008F BODY MASS INDEX DOCD: CPT | Mod: CPTII,95,, | Performed by: PHYSICIAN ASSISTANT

## 2023-02-03 PROCEDURE — 99214 OFFICE O/P EST MOD 30 MIN: CPT | Mod: 95,,, | Performed by: PHYSICIAN ASSISTANT

## 2023-02-03 PROCEDURE — 1160F RVW MEDS BY RX/DR IN RCRD: CPT | Mod: CPTII,95,, | Performed by: PHYSICIAN ASSISTANT

## 2023-02-03 PROCEDURE — 4010F PR ACE/ARB THEARPY RXD/TAKEN: ICD-10-PCS | Mod: CPTII,95,, | Performed by: PHYSICIAN ASSISTANT

## 2023-02-03 PROCEDURE — 1159F MED LIST DOCD IN RCRD: CPT | Mod: CPTII,95,, | Performed by: PHYSICIAN ASSISTANT

## 2023-02-03 PROCEDURE — 1159F PR MEDICATION LIST DOCUMENTED IN MEDICAL RECORD: ICD-10-PCS | Mod: CPTII,95,, | Performed by: PHYSICIAN ASSISTANT

## 2023-02-03 RX ORDER — GABAPENTIN 600 MG/1
600 TABLET ORAL 4 TIMES DAILY
Qty: 120 TABLET | Refills: 4 | Status: SHIPPED | OUTPATIENT
Start: 2023-02-03 | End: 2023-06-29 | Stop reason: SDUPTHER

## 2023-02-03 RX ORDER — GABAPENTIN 600 MG/1
600 TABLET ORAL 4 TIMES DAILY
Qty: 120 TABLET | Refills: 4 | Status: SHIPPED | OUTPATIENT
Start: 2023-02-03 | End: 2023-02-03

## 2023-02-03 NOTE — PROGRESS NOTES
"The patient location is: home  The chief complaint leading to consultation is: low back pain, knee pain    Visit type: audiovisual    Face to Face time with patient: 10-15 minutes  20 minutes of total time spent on the encounter, which includes face to face time and non-face to face time preparing to see the patient (eg, review of tests), Obtaining and/or reviewing separately obtained history, Documenting clinical information in the electronic or other health record, Independently interpreting results (not separately reported) and communicating results to the patient/family/caregiver, or Care coordination (not separately reported).     Each patient to whom he or she provides medical services by telemedicine is:  (1) informed of the relationship between the physician and patient and the respective role of any other health care provider with respect to management of the patient; and (2) notified that he or she may decline to receive medical services by telemedicine and may withdraw from such care at any time.      Chief Pain Complaint:  Knee pain, L>R     Interval History (2/3/2023):  patient presents today for follow-up visit.  Patient was last seen on 8/10/2022.  she feels the medication is providing adequate pain relief and reduces the negative effects of chronic pain that affects quality of life. No major SE from medications. No major changes since previous visit; patient is stable overall. Patient reports pain as 4/10 today. Knee pain is starting to return. She is using cream for knee and shoulder pain, which is helping. She is not ready to repeat knee injection yet. She states she is back on "diet pill" - Adipex. She will follow-up with GI next month. She continues to try to lose weight. Current weight around 480#.    Interval History (8/10/2022):  Cee Mora presents today for follow-up visit.  Patient was last seen about 6 months ago. Patient reports pain as 5/10 today. Left knee started to bother her, " but not to level that it was before. Seeing GI now due to potential gastroparesis - diagnosed in ER. She is awaiting gastric sleeve but has to get this taken care of before. She has lost 100 lb since this journey.    Interval History (2/4/2022): Cee Mora presents today for follow-up visit.  she underwent bilateral knee injection on 10/21/21.  The patient reports that she is/was better following the procedure.  she reports 95% pain relief.  The changes lasted 3 weeks for left knee pain, but still better than it was prior to injection.  The changes have continued through this visit.  Patient reports pain as 6/10 today. Overall, pain significantly improved.     Interval History (8/4/2021):  Cee Mora presents today on telemedicine visit.  She was seen about 6 months ago.  Her knee pain has been worsening lately. She has never had prior treatment for knee pain.  She saw a dietician on 7/19/21, and she has been eating much healthier lately. Patient reports pain as 6/10 today.    Interval History (1/28/2021): Patient was last seen 6 months ago. She reports her low back pain has been more bothersome lately, which she feels is due to weather change. She has continued to try to lose weight and is hoping to have bariatric surgery soon.  She has gone down from 535 to 389lb in 1.5-2 years.   Her BMI has reduced from 99 --> 71. Patient reports pain as 6/10 today.  Gabapentin is providing adequate pain relief.     History of Present Illness:   Cee Mora is a 57 y.o. female  who is presenting with a chief complaint of Back Pain. The patient began experiencing this problem insidiously, and the pain has been gradually worsening. The pain is described as throbbing, cramping, aching and heavy and is located in the bilateral lumbar spine  . Pain is intermittent and lasts hours. The  pain is nonradiating. The patient rates her pain a 7 out of ten and interferes with activities of daily living a 6 out of ten. Pain  is exacerbated by prolonged standing, and is improved by rest. Patient reports prior trauma, no prior spinal surgery     - pertinent negatives: No fever, No chills, No weight loss, No bladder dysfunction, No bowel dysfunction, No saddle anesthesia  - pertinent positives: generalized nonspecific Lower Extremity weakness bilaterally    - medications, other therapies tried (physical therapy, injections):     >> NSAIDs, Tylenol, Tramadol, Norco, Percocet, morphine, gabapentin and flexeril    >> Has previously undergone Physical Therapy    >>  Injections:     - bilateral knee injection on 10/21/21 with 95% pain relief -- pain started to return about 3 weeks post for left knee, but still reporting pain relief 3 months post      Imaging / Labs / Studies (reviewed on 2/3/2023):  Results for orders placed during the hospital encounter of 02/05/19   X-Ray Lumbar Complete With Flex And Ext    Narrative COMPARISON:  None.  FINDINGS:  There is mild rightward convexity of the lumbar spine.  Transitional lumbosacral anatomy noted with partial sacralization of L5.  Vertebral body heights are well maintained.  There is slight grade 1 anterolisthesis of L4 on L5 which appears minimally worsened with flexion.  There is mild-to-moderate disc height loss throughout the lumbar spine with relative sparing of L3-4.  Prominent multilevel degenerative endplate spurring noted.  Multilevel facet arthropathy is present in the lower lumbar spine.  No pars defects visualized.  Posterior elements appear grossly intact. No acute fractures demonstrated.       Review of Systems:  CONSTITUTIONAL: patient denies any fever, chills, or weight loss  SKIN: patient denies any rash or itching  RESPIRATORY: patient denies having any shortness of breath  GASTROINTESTINAL: patient denies having any diarrhea, constipation, or bowel incontinence  GENITOURINARY: patient denies having any abnormal bladder function    MUSCULOSKELETAL:  - patient complains of the  "above noted pain/s (see chief pain complaint)    NEUROLOGICAL:   - pain as above  - strength in Lower extremities is intact, BILATERALLY  - sensation in Lower extremities is intact, BILATERALLY  - patient denies any loss of bowel or bladder control      PSYCHIATRIC: patient denies any change in mood    Other:  All other systems reviewed and are negative      Physical Exam:  Telemedicine Exam  Vitals:    02/03/23 1118   Resp: 17   Weight: (!) 217.7 kg (480 lb)  Comment: pt reported   Height: 5' 2" (1.575 m)  Comment: pt reported   Body mass index is 87.79 kg/m².  (reviewed on 2/3/2023)    GENERAL: Well appearing, in no acute distress, alert and oriented x3.  obese.   PSYCH:  Mood and affect appropriate.  SKIN: Skin color, texture, turgor normal, no rashes or lesions.  HEAD/FACE:  Normocephalic, atraumatic.    PULM:  No difficulty breathing  EXTREMITIES: Peripheral joint active ROM is full and pain free without obvious instability or laxity in all four extremities- with exception of knees. No deformities, edema, or skin discoloration.  ROM of knees improved since injection.  MUSCULOSKELETAL: No atrophy or tone abnormalities are noted. Pain with extension and flexion of knees bilaterally.   GAIT: sitting in wheelchair.     Physical Exam: last visit:   General: Alert and oriented, in no apparent distress.  Gait: antalgic gait.  Skin: No rashes, No discoloration, No obvious lesions  HEENT: Normocephalic, atraumatic. Pupils equal and round.  Cardiovascular: Regular rate and rhythm , no significant peripheral edema present  Respiratory: Without audible wheezing, without use of accessory muscles of respiration.    Musculoskeletal:  Lumbar Spine  - Pain on flexion of lumbar spine Absent  - Straight Leg Raise:  Absent  - Pain on extension of lumbar spine Present  - TTP over the lumbar facet joints Present L5-S1   - Lumbar facet loading Present  -Pain on palpation over the SI joint  Absent  - CEM: " Absent      Neuro:  Strength:  LE R/L: HF: 5/5, HE: 5/5, KF: 5/5; KE: 5/5; FE: 5/5; FF: 5/5  Extremity Reflexes: Brisk and symmetric throughout.  Extremity Sensory: Sensation to pinprick and temperature symmetric. Proprioception intact.  Psych:  Mood and affect is appropriate      Assessment:  Cee Mora is a 57 y.o. year old female who is presenting with       ICD-10-CM ICD-9-CM    1. Chronic pain of both knees  M25.561 719.46     M25.562 338.29     G89.29        2. Primary osteoarthritis of both knees  M17.0 715.16       3. Diabetic polyneuropathy associated with type 2 diabetes mellitus  E11.42 250.60 gabapentin (NEURONTIN) 600 MG tablet     357.2 DISCONTINUED: gabapentin (NEURONTIN) 600 MG tablet      4. Lumbar spondylosis  M47.816 721.3           Plan:  1. Interventional: S/p bilateral knee injection on 10/21/21 with 95% pain relief -- pain started to return about 3 weeks post for left knee, but still reporting pain relief 3 months post.      2. Pharmacologic:   - Continue diclofenac 1% / lidocaine compound topical cream + gabapentin cream - from [facility].   - Refill gabapentin 600mg QID with 5 refills.  - Continue Robaxin 750mg PRN - from PCP.    3. Rehabilitative: Encouraged regular exercise. Continue exercises and activities as tolerated.     4. Diagnostic: None.    5. Follow up: 6 months for regular medication Refill - virtual visit     - This condition does not require this patient to take time off of work, and the primary goal of our Pain Management services is to improve the patient's functional capacity.   - I discussed the risks, benefits, and alternatives to potential treatment options. All questions and concerns were fully addressed today in clinic.           Disclaimer:  This note was prepared using voice recognition system and is likely to have sound alike errors that may have been overlooked even after proof reading.  Please call me with any questions.

## 2023-06-28 NOTE — PROGRESS NOTES
Established Patient - TeleHealth Visit    The patient location is: [place]  The chief complaint leading to consultation is: chronic pain     Visit type: audiovisual    Face to Face time with patient: 10-15 minutes  20 minutes of total time spent on the encounter, which includes face to face time and non-face to face time preparing to see the patient (eg, review of tests), Obtaining and/or reviewing separately obtained history, Documenting clinical information in the electronic or other health record, Independently interpreting results (not separately reported) and communicating results to the patient/family/caregiver, or Care coordination (not separately reported).     Each patient to whom he or she provides medical services by telemedicine is:  (1) informed of the relationship between the physician and patient and the respective role of any other health care provider with respect to management of the patient; and (2) notified that he or she may decline to receive medical services by telemedicine and may withdraw from such care at any time.      Chronic Pain -- Established Patient (Follow-up visit)  Chief Pain Complaint:  Knee pain, L>R     Interval History (6/29/2023):  Cee GARCIA Morgan presents today for follow-up visit.  Patient was last seen about 6 months ago. she feels the medication is providing adequate pain relief and reduces the negative effects of chronic pain that affects quality of life. No major SE from medications. No major changes since previous visit; patient is stable overall. Patient reports pain as 7/10 today. She had some dental work recently, and she was taking ibuprofen 800mg TID, which helped with pain.    Interval History (2/3/2023):  patient presents today for follow-up visit.  Patient was last seen on 8/10/2022.  she feels the medication is providing adequate pain relief and reduces the negative effects of chronic pain that affects quality of life. No major SE from medications. No major changes  "since previous visit; patient is stable overall. Patient reports pain as 4/10 today. Knee pain is starting to return. She is using cream for knee and shoulder pain, which is helping. She is not ready to repeat knee injection yet. She states she is back on "diet pill" - Adipex. She will follow-up with GI next month. She continues to try to lose weight. Current weight around 480#.    Interval History (8/10/2022):  Cee Mora presents today for follow-up visit.  Patient was last seen about 6 months ago. Patient reports pain as 5/10 today. Left knee started to bother her, but not to level that it was before. Seeing GI now due to potential gastroparesis - diagnosed in ER. She is awaiting gastric sleeve but has to get this taken care of before. She has lost 100 lb since this journey.    Interval History (2/4/2022): Cee Mora presents today for follow-up visit.  she underwent bilateral knee injection on 10/21/21.  The patient reports that she is/was better following the procedure.  she reports 95% pain relief.  The changes lasted 3 weeks for left knee pain, but still better than it was prior to injection.  The changes have continued through this visit.  Patient reports pain as 6/10 today. Overall, pain significantly improved.     Interval History (8/4/2021):  Cee Mora presents today on telemedicine visit.  She was seen about 6 months ago.  Her knee pain has been worsening lately. She has never had prior treatment for knee pain.  She saw a dietician on 7/19/21, and she has been eating much healthier lately. Patient reports pain as 6/10 today.    Interval History (1/28/2021): Patient was last seen 6 months ago. She reports her low back pain has been more bothersome lately, which she feels is due to weather change. She has continued to try to lose weight and is hoping to have bariatric surgery soon.  She has gone down from 535 to 389lb in 1.5-2 years.   Her BMI has reduced from 99 --> 71. Patient reports " pain as 6/10 today.  Gabapentin is providing adequate pain relief.     History of Present Illness:   Cee Mora is a 57 y.o. female  who is presenting with a chief complaint of Back Pain. The patient began experiencing this problem insidiously, and the pain has been gradually worsening. The pain is described as throbbing, cramping, aching and heavy and is located in the bilateral lumbar spine  . Pain is intermittent and lasts hours. The  pain is nonradiating. The patient rates her pain a 7 out of ten and interferes with activities of daily living a 6 out of ten. Pain is exacerbated by prolonged standing, and is improved by rest. Patient reports prior trauma, no prior spinal surgery     - pertinent negatives: No fever, No chills, No weight loss, No bladder dysfunction, No bowel dysfunction, No saddle anesthesia  - pertinent positives: generalized nonspecific Lower Extremity weakness bilaterally    - medications, other therapies tried (physical therapy, injections):     >> NSAIDs, Tylenol, Tramadol, Norco, Percocet, morphine, gabapentin and flexeril    >> Has previously undergone Physical Therapy    >>  Injections:     - bilateral knee injection on 10/21/21 with 95% pain relief -- pain started to return about 3 weeks post for left knee, but still reporting pain relief 3 months post      Imaging / Labs / Studies (reviewed on 6/29/2023):  Results for orders placed during the hospital encounter of 02/05/19   X-Ray Lumbar Complete With Flex And Ext    Narrative COMPARISON:  None.  FINDINGS:  There is mild rightward convexity of the lumbar spine.  Transitional lumbosacral anatomy noted with partial sacralization of L5.  Vertebral body heights are well maintained.  There is slight grade 1 anterolisthesis of L4 on L5 which appears minimally worsened with flexion.  There is mild-to-moderate disc height loss throughout the lumbar spine with relative sparing of L3-4.  Prominent multilevel degenerative endplate spurring  noted.  Multilevel facet arthropathy is present in the lower lumbar spine.  No pars defects visualized.  Posterior elements appear grossly intact. No acute fractures demonstrated.       Review of Systems:  CONSTITUTIONAL: patient denies any fever, chills, or weight loss  SKIN: patient denies any rash or itching  RESPIRATORY: patient denies having any shortness of breath  GASTROINTESTINAL: patient denies having any diarrhea, constipation, or bowel incontinence  GENITOURINARY: patient denies having any abnormal bladder function    MUSCULOSKELETAL:  - patient complains of the above noted pain/s (see chief pain complaint)    NEUROLOGICAL:   - pain as above  - strength in Lower extremities is intact, BILATERALLY  - sensation in Lower extremities is intact, BILATERALLY  - patient denies any loss of bowel or bladder control      PSYCHIATRIC: patient denies any change in mood    Other:  All other systems reviewed and are negative      Physical Exam:  Telemedicine Exam  Vitals:    06/29/23 1103   Resp: 17     There is no height or weight on file to calculate BMI.   (reviewed on 6/29/2023)     GENERAL: Well appearing, in no acute distress, alert and oriented x3.   PSYCH:  Mood and affect appropriate.  SKIN: Skin color & texture with no obvious abnormalities.    HEAD/FACE:  Normocephalic, atraumatic.    PULM:  No difficulty breathing. No nasal flaring.    EXTREMITIES: No obvious deformities. Moving all extremities well, appears to have symmetric strength throughout.  MUSCULOSKELETAL: No obvious atrophy abnormalities are noted.   NEURO: No obvious neurologic deficit.   GAIT: sitting.     Physical Exam: last in clinic visit:  General: Alert and oriented, in no apparent distress.  Gait: antalgic gait.  Skin: No rashes, No discoloration, No obvious lesions  HEENT: Normocephalic, atraumatic. Pupils equal and round.  Cardiovascular: Regular rate and rhythm , no significant peripheral edema present  Respiratory: Without audible  wheezing, without use of accessory muscles of respiration.    Musculoskeletal:  Lumbar Spine  - Pain on flexion of lumbar spine Absent  - Straight Leg Raise:  Absent  - Pain on extension of lumbar spine Present  - TTP over the lumbar facet joints Present L5-S1   - Lumbar facet loading Present  -Pain on palpation over the SI joint  Absent  - CEM: Absent      Neuro:  Strength:  LE R/L: HF: 5/5, HE: 5/5, KF: 5/5; KE: 5/5; FE: 5/5; FF: 5/5  Extremity Reflexes: Brisk and symmetric throughout.  Extremity Sensory: Sensation to pinprick and temperature symmetric. Proprioception intact.  Psych:  Mood and affect is appropriate      Assessment:  Cee Mora is a 57 y.o. year old female who is presenting with       ICD-10-CM ICD-9-CM    1. Chronic pain of both knees  M25.561 719.46     M25.562 338.29     G89.29        2. Primary osteoarthritis of both knees  M17.0 715.16       3. Diabetic polyneuropathy associated with type 2 diabetes mellitus  E11.42 250.60 gabapentin (NEURONTIN) 600 MG tablet     357.2 DISCONTINUED: gabapentin (NEURONTIN) 600 MG tablet      4. Lumbar spondylosis  M47.816 721.3           Plan:  1. Interventional: S/p bilateral knee injection on 10/21/21 with 95% pain relief -- pain started to return about 3 weeks post for left knee, but still reporting pain relief 3 months post.      2. Pharmacologic:   - Order compound cream (could be varying components includin.5% nabumetone, 2.5% gabapentin, 2.5% lidocaine, 2.5% prilocaine - depending on insurance coverage) for potential topical pain relief. Patent will be contacted by [facility] regarding cost and payment.    - Refill gabapentin 600mg QID with 5 refills.  - Continue Robaxin 750mg PRN - from PCP.  - Continue ibuprofen 600mg up TID PRN - from PCP.      3. Rehabilitative: Encouraged regular exercise. Continue exercises and activities as tolerated.     4. Diagnostic: None.    5. Follow up: 6 months for regular medication Refill -  virtual visit     - This condition does not require this patient to take time off of work, and the primary goal of our Pain Management services is to improve the patient's functional capacity.   - I discussed the risks, benefits, and alternatives to potential treatment options. All questions and concerns were fully addressed today in clinic.         Ebony Gonzalez PA-C  Interventional Pain Management - [facility]    Disclaimer:  This note was prepared using voice recognition system and is likely to have sound alike errors that may have been overlooked even after proof reading.  Please call me with any questions.

## 2023-06-29 ENCOUNTER — OFFICE VISIT (OUTPATIENT)
Dept: PAIN MEDICINE | Facility: CLINIC | Age: 58
End: 2023-06-29
Payer: MEDICAID

## 2023-06-29 VITALS — RESPIRATION RATE: 17 BRPM

## 2023-06-29 DIAGNOSIS — M17.0 PRIMARY OSTEOARTHRITIS OF BOTH KNEES: ICD-10-CM

## 2023-06-29 DIAGNOSIS — M25.561 CHRONIC PAIN OF BOTH KNEES: Primary | ICD-10-CM

## 2023-06-29 DIAGNOSIS — E11.42 DIABETIC POLYNEUROPATHY ASSOCIATED WITH TYPE 2 DIABETES MELLITUS: ICD-10-CM

## 2023-06-29 DIAGNOSIS — M25.562 CHRONIC PAIN OF BOTH KNEES: Primary | ICD-10-CM

## 2023-06-29 DIAGNOSIS — G89.29 CHRONIC PAIN OF BOTH KNEES: Primary | ICD-10-CM

## 2023-06-29 DIAGNOSIS — M47.816 LUMBAR SPONDYLOSIS: ICD-10-CM

## 2023-06-29 PROCEDURE — 4010F ACE/ARB THERAPY RXD/TAKEN: CPT | Mod: CPTII,95,, | Performed by: PHYSICIAN ASSISTANT

## 2023-06-29 PROCEDURE — 1160F RVW MEDS BY RX/DR IN RCRD: CPT | Mod: CPTII,95,, | Performed by: PHYSICIAN ASSISTANT

## 2023-06-29 PROCEDURE — 1159F MED LIST DOCD IN RCRD: CPT | Mod: CPTII,95,, | Performed by: PHYSICIAN ASSISTANT

## 2023-06-29 PROCEDURE — 99214 OFFICE O/P EST MOD 30 MIN: CPT | Mod: 95,,, | Performed by: PHYSICIAN ASSISTANT

## 2023-06-29 PROCEDURE — 4010F PR ACE/ARB THEARPY RXD/TAKEN: ICD-10-PCS | Mod: CPTII,95,, | Performed by: PHYSICIAN ASSISTANT

## 2023-06-29 PROCEDURE — 1159F PR MEDICATION LIST DOCUMENTED IN MEDICAL RECORD: ICD-10-PCS | Mod: CPTII,95,, | Performed by: PHYSICIAN ASSISTANT

## 2023-06-29 PROCEDURE — 1160F PR REVIEW ALL MEDS BY PRESCRIBER/CLIN PHARMACIST DOCUMENTED: ICD-10-PCS | Mod: CPTII,95,, | Performed by: PHYSICIAN ASSISTANT

## 2023-06-29 PROCEDURE — 99214 PR OFFICE/OUTPT VISIT, EST, LEVL IV, 30-39 MIN: ICD-10-PCS | Mod: 95,,, | Performed by: PHYSICIAN ASSISTANT

## 2023-06-29 RX ORDER — GABAPENTIN 600 MG/1
600 TABLET ORAL 4 TIMES DAILY
Qty: 120 TABLET | Refills: 5 | Status: SHIPPED | OUTPATIENT
Start: 2023-06-29 | End: 2023-12-01 | Stop reason: SDUPTHER

## 2023-06-29 RX ORDER — GABAPENTIN 600 MG/1
600 TABLET ORAL 4 TIMES DAILY
Qty: 120 TABLET | Refills: 4 | Status: SHIPPED | OUTPATIENT
Start: 2023-06-29 | End: 2023-06-29

## 2023-12-01 ENCOUNTER — OFFICE VISIT (OUTPATIENT)
Dept: PAIN MEDICINE | Facility: CLINIC | Age: 58
End: 2023-12-01
Payer: MEDICAID

## 2023-12-01 VITALS — RESPIRATION RATE: 17 BRPM

## 2023-12-01 DIAGNOSIS — M25.561 CHRONIC PAIN OF BOTH KNEES: Primary | ICD-10-CM

## 2023-12-01 DIAGNOSIS — M17.0 PRIMARY OSTEOARTHRITIS OF BOTH KNEES: ICD-10-CM

## 2023-12-01 DIAGNOSIS — M25.562 CHRONIC PAIN OF BOTH KNEES: Primary | ICD-10-CM

## 2023-12-01 DIAGNOSIS — G89.29 CHRONIC PAIN OF BOTH KNEES: Primary | ICD-10-CM

## 2023-12-01 DIAGNOSIS — E11.42 DIABETIC POLYNEUROPATHY ASSOCIATED WITH TYPE 2 DIABETES MELLITUS: ICD-10-CM

## 2023-12-01 DIAGNOSIS — M47.816 LUMBAR SPONDYLOSIS: ICD-10-CM

## 2023-12-01 PROCEDURE — 4010F ACE/ARB THERAPY RXD/TAKEN: CPT | Mod: CPTII,95,, | Performed by: PHYSICIAN ASSISTANT

## 2023-12-01 PROCEDURE — 1159F PR MEDICATION LIST DOCUMENTED IN MEDICAL RECORD: ICD-10-PCS | Mod: CPTII,95,, | Performed by: PHYSICIAN ASSISTANT

## 2023-12-01 PROCEDURE — 3052F PR MOST RECENT HEMOGLOBIN A1C LEVEL 8.0 - < 9.0%: ICD-10-PCS | Mod: CPTII,95,, | Performed by: PHYSICIAN ASSISTANT

## 2023-12-01 PROCEDURE — 99214 OFFICE O/P EST MOD 30 MIN: CPT | Mod: 95,,, | Performed by: PHYSICIAN ASSISTANT

## 2023-12-01 PROCEDURE — 1160F PR REVIEW ALL MEDS BY PRESCRIBER/CLIN PHARMACIST DOCUMENTED: ICD-10-PCS | Mod: CPTII,95,, | Performed by: PHYSICIAN ASSISTANT

## 2023-12-01 PROCEDURE — 3052F HG A1C>EQUAL 8.0%<EQUAL 9.0%: CPT | Mod: CPTII,95,, | Performed by: PHYSICIAN ASSISTANT

## 2023-12-01 PROCEDURE — 4010F PR ACE/ARB THEARPY RXD/TAKEN: ICD-10-PCS | Mod: CPTII,95,, | Performed by: PHYSICIAN ASSISTANT

## 2023-12-01 PROCEDURE — 1159F MED LIST DOCD IN RCRD: CPT | Mod: CPTII,95,, | Performed by: PHYSICIAN ASSISTANT

## 2023-12-01 PROCEDURE — 1160F RVW MEDS BY RX/DR IN RCRD: CPT | Mod: CPTII,95,, | Performed by: PHYSICIAN ASSISTANT

## 2023-12-01 PROCEDURE — 99214 PR OFFICE/OUTPT VISIT, EST, LEVL IV, 30-39 MIN: ICD-10-PCS | Mod: 95,,, | Performed by: PHYSICIAN ASSISTANT

## 2023-12-01 RX ORDER — GABAPENTIN 600 MG/1
600 TABLET ORAL 4 TIMES DAILY
Qty: 120 TABLET | Refills: 5 | Status: SHIPPED | OUTPATIENT
Start: 2023-12-01

## 2023-12-01 NOTE — PROGRESS NOTES
Established Patient - TeleHealth Visit    The patient location is: [place]  The chief complaint leading to consultation is: chronic pain     Visit type: audiovisual    Face to Face time with patient: 10-15 minutes  20 minutes of total time spent on the encounter, which includes face to face time and non-face to face time preparing to see the patient (eg, review of tests), Obtaining and/or reviewing separately obtained history, Documenting clinical information in the electronic or other health record, Independently interpreting results (not separately reported) and communicating results to the patient/family/caregiver, or Care coordination (not separately reported).     Each patient to whom he or she provides medical services by telemedicine is:  (1) informed of the relationship between the physician and patient and the respective role of any other health care provider with respect to management of the patient; and (2) notified that he or she may decline to receive medical services by telemedicine and may withdraw from such care at any time.      Chronic Pain -- Established Patient (Follow-up visit)  Chief Pain Complaint:  Knee pain, L>R     Interval History (12/1/2023): Patient was last seen on 6/29/2023. she presents today for follow-up for medication refill. she feels the gabapentin (Neurontin) is providing adequate pain relief and reduces the negative effects of chronic pain that affects quality of life. No major SE from medications. Colder weather Increases pain. Patient reports pain as 5/10 today.     Interval History (6/29/2023):  Cee GARCIA Mora presents today for follow-up visit.  Patient was last seen about 6 months ago. she feels the medication is providing adequate pain relief and reduces the negative effects of chronic pain that affects quality of life. No major SE from medications. No major changes since previous visit; patient is stable overall. Patient reports pain as 7/10 today. She had some dental work  "recently, and she was taking ibuprofen 800mg TID, which helped with pain.    Interval History (2/3/2023):  patient presents today for follow-up visit.  Patient was last seen on 8/10/2022.  she feels the medication is providing adequate pain relief and reduces the negative effects of chronic pain that affects quality of life. No major SE from medications. No major changes since previous visit; patient is stable overall. Patient reports pain as 4/10 today. Knee pain is starting to return. She is using cream for knee and shoulder pain, which is helping. She is not ready to repeat knee injection yet. She states she is back on "diet pill" - Adipex. She will follow-up with GI next month. She continues to try to lose weight. Current weight around 480#.    Interval History (8/10/2022):  Cee Mora presents today for follow-up visit.  Patient was last seen about 6 months ago. Patient reports pain as 5/10 today. Left knee started to bother her, but not to level that it was before. Seeing GI now due to potential gastroparesis - diagnosed in ER. She is awaiting gastric sleeve but has to get this taken care of before. She has lost 100 lb since this journey.    Interval History (2/4/2022): Cee Mora presents today for follow-up visit.  she underwent bilateral knee injection on 10/21/21.  The patient reports that she is/was better following the procedure.  she reports 95% pain relief.  The changes lasted 3 weeks for left knee pain, but still better than it was prior to injection.  The changes have continued through this visit.  Patient reports pain as 6/10 today. Overall, pain significantly improved.     Interval History (8/4/2021):  Cee Mora presents today on telemedicine visit.  She was seen about 6 months ago.  Her knee pain has been worsening lately. She has never had prior treatment for knee pain.  She saw a dietician on 7/19/21, and she has been eating much healthier lately. Patient reports pain as 6/10 " today.    Interval History (1/28/2021): Patient was last seen 6 months ago. She reports her low back pain has been more bothersome lately, which she feels is due to weather change. She has continued to try to lose weight and is hoping to have bariatric surgery soon.  She has gone down from 535 to 389lb in 1.5-2 years.   Her BMI has reduced from 99 --> 71. Patient reports pain as 6/10 today.  Gabapentin is providing adequate pain relief.     History of Present Illness:   Cee Mora is a 58 y.o. female  who is presenting with a chief complaint of Back Pain. The patient began experiencing this problem insidiously, and the pain has been gradually worsening. The pain is described as throbbing, cramping, aching and heavy and is located in the bilateral lumbar spine  . Pain is intermittent and lasts hours. The  pain is nonradiating. The patient rates her pain a 7 out of ten and interferes with activities of daily living a 6 out of ten. Pain is exacerbated by prolonged standing, and is improved by rest. Patient reports prior trauma, no prior spinal surgery     - pertinent negatives: No fever, No chills, No weight loss, No bladder dysfunction, No bowel dysfunction, No saddle anesthesia  - pertinent positives: generalized nonspecific Lower Extremity weakness bilaterally    - medications, other therapies tried (physical therapy, injections):     >> NSAIDs, Tylenol, Tramadol, Norco, Percocet, morphine, gabapentin and flexeril    >> Has previously undergone Physical Therapy    >>  Injections:     - bilateral knee injection on 10/21/21 with 95% pain relief -- pain started to return about 3 weeks post for left knee, but still reporting pain relief 3 months post      Imaging / Labs / Studies (reviewed on 12/1/2023):  Results for orders placed during the hospital encounter of 02/05/19   X-Ray Lumbar Complete With Flex And Ext    Narrative COMPARISON:  None.  FINDINGS:  There is mild rightward convexity of the lumbar spine.   Transitional lumbosacral anatomy noted with partial sacralization of L5.  Vertebral body heights are well maintained.  There is slight grade 1 anterolisthesis of L4 on L5 which appears minimally worsened with flexion.  There is mild-to-moderate disc height loss throughout the lumbar spine with relative sparing of L3-4.  Prominent multilevel degenerative endplate spurring noted.  Multilevel facet arthropathy is present in the lower lumbar spine.  No pars defects visualized.  Posterior elements appear grossly intact. No acute fractures demonstrated.       Review of Systems:  CONSTITUTIONAL: patient denies any fever, chills, or weight loss  SKIN: patient denies any rash or itching  RESPIRATORY: patient denies having any shortness of breath  GASTROINTESTINAL: patient denies having any diarrhea, constipation, or bowel incontinence  GENITOURINARY: patient denies having any abnormal bladder function    MUSCULOSKELETAL:  - patient complains of the above noted pain/s (see chief pain complaint)    NEUROLOGICAL:   - pain as above  - strength in Lower extremities is intact, BILATERALLY  - sensation in Lower extremities is intact, BILATERALLY  - patient denies any loss of bowel or bladder control      PSYCHIATRIC: patient denies any change in mood    Other:  All other systems reviewed and are negative      Physical Exam:  Telemedicine Exam  Vitals:    12/01/23 1015   Resp: 17     There is no height or weight on file to calculate BMI.   (reviewed on 12/1/2023)     GENERAL: Well appearing, in no acute distress, alert and oriented x3.   PSYCH:  Mood and affect appropriate.  SKIN: Skin color & texture with no obvious abnormalities.    HEAD/FACE:  Normocephalic, atraumatic.    PULM:  No difficulty breathing.   GI: no obvious distention.   EXTREMITIES: No obvious deformities. Moving all extremities well, appears to have symmetric strength throughout.  MUSCULOSKELETAL: No obvious atrophy abnormalities are noted.   NEURO: No obvious  neurologic deficit.   GAIT: sitting.     Physical Exam: last in clinic visit:  General: Alert and oriented, in no apparent distress.  Gait: antalgic gait.  Skin: No rashes, No discoloration, No obvious lesions  HEENT: Normocephalic, atraumatic. Pupils equal and round.  Cardiovascular: Regular rate and rhythm , no significant peripheral edema present  Respiratory: Without audible wheezing, without use of accessory muscles of respiration.    Musculoskeletal:  Lumbar Spine  - Pain on flexion of lumbar spine Absent  - Straight Leg Raise:  Absent  - Pain on extension of lumbar spine Present  - TTP over the lumbar facet joints Present L5-S1   - Lumbar facet loading Present  -Pain on palpation over the SI joint  Absent  - CEM: Absent      Neuro:  Strength:  LE R/L: HF: 5/5, HE: 5/5, KF: 5/5; KE: 5/5; FE: 5/5; FF: 5/5  Extremity Reflexes: Brisk and symmetric throughout.  Extremity Sensory: Sensation to pinprick and temperature symmetric. Proprioception intact.  Psych:  Mood and affect is appropriate      Assessment:  Cee Mora is a 58 y.o. year old female who is presenting with       ICD-10-CM ICD-9-CM    1. Chronic pain of both knees  M25.561 719.46     M25.562 338.29     G89.29        2. Diabetic polyneuropathy associated with type 2 diabetes mellitus  E11.42 250.60 gabapentin (NEURONTIN) 600 MG tablet     357.2       3. Primary osteoarthritis of both knees  M17.0 715.16       4. Lumbar spondylosis  M47.816 721.3           Plan:  1. Interventional: S/p bilateral knee injection on 10/21/21 with 95% pain relief -- pain started to return about 3 weeks post for left knee, but still reporting pain relief 3 months post.      2. Pharmacologic:   - Continue compound cream (could be varying components includin.5% nabumetone, 2.5% gabapentin, 2.5% lidocaine, 2.5% prilocaine - depending on insurance coverage) for potential topical pain relief. Patent will be contacted by [facility] regarding cost and  payment.    - Refill gabapentin 600mg QID with 5 refills.  - Continue Robaxin 750mg PRN - from PCP.  - Continue ibuprofen 600mg up TID PRN - from PCP.      3. Rehabilitative: Encouraged regular exercise. Continue exercises and activities as tolerated.     4. Diagnostic/ Imaging: No new imaging ordered. Previous imaging reviewed.     5. Follow up: 6 months for medication Refill - virtual visit     - This condition does not require this patient to take time off of work, and the primary goal of our Pain Management services is to improve the patient's functional capacity.   - I discussed the risks, benefits, and alternatives to potential treatment options. All questions and concerns were fully addressed today in clinic.         Ebony Gonzalez PA-C  Interventional Pain Management - [facility]    Disclaimer:  This note was prepared using voice recognition system and is likely to have sound alike errors that may have been overlooked even after proof reading.  Please call me with any questions.

## 2023-12-05 ENCOUNTER — TELEPHONE (OUTPATIENT)
Dept: PAIN MEDICINE | Facility: CLINIC | Age: 58
End: 2023-12-05
Payer: MEDICAID

## 2023-12-05 NOTE — TELEPHONE ENCOUNTER
----- Message from Ebony Gonzalez PA-C sent at 12/1/2023 10:18 AM CST -----  Please schedule on a Friday ([facility])  6 months for medication Refill - virtual visit

## 2024-06-07 ENCOUNTER — DOCUMENTATION ONLY (OUTPATIENT)
Dept: PAIN MEDICINE | Facility: CLINIC | Age: 59
End: 2024-06-07
Payer: MEDICAID

## 2024-06-07 NOTE — PROGRESS NOTES
Precharting for 6/7/2024 (appointment not completed)     Established Patient - TeleHealth Visit    The patient location is: [place]  The chief complaint leading to consultation is: chronic pain     Visit type: audiovisual    Face to Face time with patient: 10-15 minutes  20 minutes of total time spent on the encounter, which includes face to face time and non-face to face time preparing to see the patient (eg, review of tests), Obtaining and/or reviewing separately obtained history, Documenting clinical information in the electronic or other health record, Independently interpreting results (not separately reported) and communicating results to the patient/family/caregiver, or Care coordination (not separately reported).     Each patient to whom he or she provides medical services by telemedicine is:  (1) informed of the relationship between the physician and patient and the respective role of any other health care provider with respect to management of the patient; and (2) notified that he or she may decline to receive medical services by telemedicine and may withdraw from such care at any time.      Chronic Pain -- Established Patient (Follow-up visit)  Chief Pain Complaint:  Knee pain, L>R     Interval History (6/7/2024): Cee Mora was last seen about six-months ago. No major changes since previous visit; patient is stable overall. she presents today for follow-up for medication refill. she feels the gabapentin is providing adequate pain relief and reduces the negative effects of chronic pain that affects quality of life. No major SE from medications. Patient reports pain as  */10  today.     Interval History (12/1/2023): Patient was last seen on 6/29/2023. she presents today for follow-up for medication refill. she feels the gabapentin (Neurontin) is providing adequate pain relief and reduces the negative effects of chronic pain that affects quality of life. No major SE from medications. Colder weather  "Increases pain. Patient reports pain as 5/10 today.     Interval History (6/29/2023):  Cee Mora presents today for follow-up visit.  Patient was last seen about 6 months ago. she feels the medication is providing adequate pain relief and reduces the negative effects of chronic pain that affects quality of life. No major SE from medications. No major changes since previous visit; patient is stable overall. Patient reports pain as 7/10 today. She had some dental work recently, and she was taking ibuprofen 800mg TID, which helped with pain.    Interval History (2/3/2023):  patient presents today for follow-up visit.  Patient was last seen on 8/10/2022.  she feels the medication is providing adequate pain relief and reduces the negative effects of chronic pain that affects quality of life. No major SE from medications. No major changes since previous visit; patient is stable overall. Patient reports pain as 4/10 today. Knee pain is starting to return. She is using cream for knee and shoulder pain, which is helping. She is not ready to repeat knee injection yet. She states she is back on "diet pill" - Adipex. She will follow-up with GI next month. She continues to try to lose weight. Current weight around 480#.    Interval History (8/10/2022):  Cee Mora presents today for follow-up visit.  Patient was last seen about 6 months ago. Patient reports pain as 5/10 today. Left knee started to bother her, but not to level that it was before. Seeing GI now due to potential gastroparesis - diagnosed in ER. She is awaiting gastric sleeve but has to get this taken care of before. She has lost 100 lb since this journey.    Interval History (2/4/2022): Cee Mora presents today for follow-up visit.  she underwent bilateral knee injection on 10/21/21.  The patient reports that she is/was better following the procedure.  she reports 95% pain relief.  The changes lasted 3 weeks for left knee pain, but still better " than it was prior to injection.  The changes have continued through this visit.  Patient reports pain as 6/10 today. Overall, pain significantly improved.     Interval History (8/4/2021):  Cee Mora presents today on telemedicine visit.  She was seen about 6 months ago.  Her knee pain has been worsening lately. She has never had prior treatment for knee pain.  She saw a dietician on 7/19/21, and she has been eating much healthier lately. Patient reports pain as 6/10 today.    Interval History (1/28/2021): Patient was last seen 6 months ago. She reports her low back pain has been more bothersome lately, which she feels is due to weather change. She has continued to try to lose weight and is hoping to have bariatric surgery soon.  She has gone down from 535 to 389lb in 1.5-2 years.   Her BMI has reduced from 99 --> 71. Patient reports pain as 6/10 today.  Gabapentin is providing adequate pain relief.     History of Present Illness:   Cee Mora is a 58 y.o. female  who is presenting with a chief complaint of Back Pain. The patient began experiencing this problem insidiously, and the pain has been gradually worsening. The pain is described as throbbing, cramping, aching and heavy and is located in the bilateral lumbar spine  . Pain is intermittent and lasts hours. The  pain is nonradiating. The patient rates her pain a 7 out of ten and interferes with activities of daily living a 6 out of ten. Pain is exacerbated by prolonged standing, and is improved by rest. Patient reports prior trauma, no prior spinal surgery     - pertinent negatives: No fever, No chills, No weight loss, No bladder dysfunction, No bowel dysfunction, No saddle anesthesia  - pertinent positives: generalized nonspecific Lower Extremity weakness bilaterally    - medications, other therapies tried (physical therapy, injections):     >> NSAIDs, Tylenol, Tramadol, Norco, Percocet, morphine, gabapentin and flexeril    >> Has previously  undergone Physical Therapy    >>  Injections:     - bilateral knee injection on 10/21/21 with 95% pain relief -- pain started to return about 3 weeks post for left knee, but still reporting pain relief 3 months post      Imaging / Labs / Studies (reviewed on 6/7/2024):  Results for orders placed during the hospital encounter of 02/05/19   X-Ray Lumbar Complete With Flex And Ext    Narrative COMPARISON:  None.  FINDINGS:  There is mild rightward convexity of the lumbar spine.  Transitional lumbosacral anatomy noted with partial sacralization of L5.  Vertebral body heights are well maintained.  There is slight grade 1 anterolisthesis of L4 on L5 which appears minimally worsened with flexion.  There is mild-to-moderate disc height loss throughout the lumbar spine with relative sparing of L3-4.  Prominent multilevel degenerative endplate spurring noted.  Multilevel facet arthropathy is present in the lower lumbar spine.  No pars defects visualized.  Posterior elements appear grossly intact. No acute fractures demonstrated.       Review of Systems:  CONSTITUTIONAL: patient denies any fever, chills, or weight loss  SKIN: patient denies any rash or itching  RESPIRATORY: patient denies having any shortness of breath  GASTROINTESTINAL: patient denies having any diarrhea, constipation, or bowel incontinence  GENITOURINARY: patient denies having any abnormal bladder function    MUSCULOSKELETAL:  - patient complains of the above noted pain/s (see chief pain complaint)    NEUROLOGICAL:   - pain as above  - strength in Lower extremities is intact, BILATERALLY  - sensation in Lower extremities is intact, BILATERALLY  - patient denies any loss of bowel or bladder control      PSYCHIATRIC: patient denies any change in mood    Other:  All other systems reviewed and are negative      Physical Exam:  Telemedicine Exam  There were no vitals filed for this visit.    There is no height or weight on file to calculate BMI.   (reviewed on  6/7/2024)     GENERAL: Well appearing, in no acute distress, alert and oriented x3.   PSYCH:  Mood and affect appropriate.  SKIN: Skin color & texture with no obvious abnormalities.    HEAD/FACE:  Normocephalic, atraumatic.    PULM:  No difficulty breathing.  No audible wheezing.  EXTREMITIES: No obvious deformities. Moving all extremities well, appears to have symmetric strength throughout.  MUSCULOSKELETAL: No obvious atrophy abnormalities are noted.   NEURO: No obvious neurologic deficit.   GAIT: sitting.     Physical Exam: last in clinic visit:  General: Alert and oriented, in no apparent distress.  Gait: antalgic gait.  Skin: No rashes, No discoloration, No obvious lesions  HEENT: Normocephalic, atraumatic. Pupils equal and round.  Cardiovascular: Regular rate and rhythm , no significant peripheral edema present  Respiratory: Without audible wheezing, without use of accessory muscles of respiration.    Musculoskeletal:  Lumbar Spine  - Pain on flexion of lumbar spine Absent  - Straight Leg Raise:  Absent  - Pain on extension of lumbar spine Present  - TTP over the lumbar facet joints Present L5-S1   - Lumbar facet loading Present  -Pain on palpation over the SI joint  Absent  - CEM: Absent      Neuro:  Strength:  LE R/L: HF: 5/5, HE: 5/5, KF: 5/5; KE: 5/5; FE: 5/5; FF: 5/5  Extremity Reflexes: Brisk and symmetric throughout.  Extremity Sensory: Sensation to pinprick and temperature symmetric. Proprioception intact.  Psych:  Mood and affect is appropriate      Assessment:  Cee Mora is a 58 y.o. year old female who is presenting with     No diagnosis found.        Plan:  1. Interventional:   - Consider QUTENZA® (capsaicin) 8% topical system for diabetic peripheral neuropathy (DPN) of the feet.  - S/p bilateral knee injection on 10/21/21 with 95% pain relief -- pain started to return about 3 weeks post for left knee, but still reporting pain relief 3 months post.      2. Pharmacologic:   - Continue  compound cream (could be varying components includin.5% nabumetone, 2.5% gabapentin, 2.5% lidocaine, 2.5% prilocaine - depending on insurance coverage) for potential topical pain relief. Patent will be contacted by [facility] regarding cost and payment.    - Refill gabapentin 600mg QID with 5 refills.  - Continue Robaxin 750mg PRN - from PCP.  - Continue ibuprofen 600mg up TID PRN - from PCP.      3. Rehabilitative: Encouraged regular exercise. Continue exercises and activities as tolerated.     4. Diagnostic/ Imaging: No new imaging ordered. Previous imaging reviewed.     5. Follow up: 6 months for medication Refill - virtual visit     - This condition does not require this patient to take time off of work, and the primary goal of our Pain Management services is to improve the patient's functional capacity.   - I discussed the risks, benefits, and alternatives to potential treatment options. All questions and concerns were fully addressed today in clinic.         Ebony Gonzalez PA-C  Interventional Pain Management - [facility]    Disclaimer:  This note was prepared using voice recognition system and is likely to have sound alike errors that may have been overlooked even after proof reading.  Please call me with any questions.

## 2024-06-18 NOTE — TELEPHONE ENCOUNTER
Spoke with patient. Offered her an appt with Ebony Gonzalez PA-C as it has been 1.5 years since she has been seen in clinic. Patient states that she can not wear a mask. Offered patient virtual visit. Patient gladly accepted. Sent text sign up to patient. Advised patient to call back once Mychart is set up to schedule appointment. Patient verbalized understanding.    Home

## 2024-08-09 ENCOUNTER — PATIENT MESSAGE (OUTPATIENT)
Dept: PAIN MEDICINE | Facility: CLINIC | Age: 59
End: 2024-08-09
Payer: MEDICAID

## 2024-08-14 NOTE — PROGRESS NOTES
Established Patient - TeleHealth Visit    The patient location is: [place]  The chief complaint leading to consultation is: chronic pain     Visit type: audiovisual    Face to Face time with patient: 10-15 minutes  20 minutes of total time spent on the encounter, which includes face to face time and non-face to face time preparing to see the patient (eg, review of tests), Obtaining and/or reviewing separately obtained history, Documenting clinical information in the electronic or other health record, Independently interpreting results (not separately reported) and communicating results to the patient/family/caregiver, or Care coordination (not separately reported).     Each patient to whom he or she provides medical services by telemedicine is:  (1) informed of the relationship between the physician and patient and the respective role of any other health care provider with respect to management of the patient; and (2) notified that he or she may decline to receive medical services by telemedicine and may withdraw from such care at any time.      Chronic Pain -- Established Patient (Follow-up visit)  Chief Pain Complaint:  Chief Complaint   Patient presents with    Follow-up   Knee pain, L>R     Interval History (8/15/2024): Cee Mora was last seen about 8-months ago.  She presents today reporting that she has lost a lot of weight. She has not been taking gabapentin because she had to crush the pills because she couldn't swallow them. She is now on gabapentin (Neurontin) 300mg BID, which she would like to Increase to TID; she doesn't want to go back to original dose of gabapentin (Neurontin) 600mg QID though.  When she was released after stomach surgery, she was given Flexeril 10mg, which has been helping - better than Robaxin (methocarbamol).  She continues to lose weight. Her weight is down to 326#.  She will join a pool exercise club next week. She is still in the wheelchair and is hoping to be out of it soon.  "She is no longer on eliquis post op. She is trying to take less ibuprofen, and PCP gave her Celebrex to take instead so she could take less ibuprofen. She is off of hypertension medications. She also is taking less diabetic medications.             Interval History (12/1/2023): Patient was last seen on 6/29/2023. she presents today for follow-up for medication refill. she feels the gabapentin (Neurontin) is providing adequate pain relief and reduces the negative effects of chronic pain that affects quality of life. No major SE from medications. Colder weather Increases pain. Patient reports pain as 5/10 today.     Interval History (6/29/2023):  Cee Mora presents today for follow-up visit.  Patient was last seen about 6 months ago. she feels the medication is providing adequate pain relief and reduces the negative effects of chronic pain that affects quality of life. No major SE from medications. No major changes since previous visit; patient is stable overall. Patient reports pain as 7/10 today. She had some dental work recently, and she was taking ibuprofen 800mg TID, which helped with pain.    Interval History (2/3/2023):  patient presents today for follow-up visit.  Patient was last seen on 8/10/2022.  she feels the medication is providing adequate pain relief and reduces the negative effects of chronic pain that affects quality of life. No major SE from medications. No major changes since previous visit; patient is stable overall. Patient reports pain as 4/10 today. Knee pain is starting to return. She is using cream for knee and shoulder pain, which is helping. She is not ready to repeat knee injection yet. She states she is back on "diet pill" - Adipex. She will follow-up with GI next month. She continues to try to lose weight. Current weight around 480#.    Interval History (8/10/2022):  Cee Mora presents today for follow-up visit.  Patient was last seen about 6 months ago. Patient reports " pain as 5/10 today. Left knee started to bother her, but not to level that it was before. Seeing GI now due to potential gastroparesis - diagnosed in ER. She is awaiting gastric sleeve but has to get this taken care of before. She has lost 100 lb since this journey.    Interval History (2/4/2022): Cee Mora presents today for follow-up visit.  she underwent bilateral knee injection on 10/21/21.  The patient reports that she is/was better following the procedure.  she reports 95% pain relief.  The changes lasted 3 weeks for left knee pain, but still better than it was prior to injection.  The changes have continued through this visit.  Patient reports pain as 6/10 today. Overall, pain significantly improved.     Interval History (8/4/2021):  Cee Mora presents today on telemedicine visit.  She was seen about 6 months ago.  Her knee pain has been worsening lately. She has never had prior treatment for knee pain.  She saw a dietician on 7/19/21, and she has been eating much healthier lately. Patient reports pain as 6/10 today.    Interval History (1/28/2021): Patient was last seen 6 months ago. She reports her low back pain has been more bothersome lately, which she feels is due to weather change. She has continued to try to lose weight and is hoping to have bariatric surgery soon.  She has gone down from 535 to 389lb in 1.5-2 years.   Her BMI has reduced from 99 --> 71. Patient reports pain as 6/10 today.  Gabapentin is providing adequate pain relief.     History of Present Illness:   Cee Mora is a 59 y.o. female  who is presenting with a chief complaint of Back Pain. The patient began experiencing this problem insidiously, and the pain has been gradually worsening. The pain is described as throbbing, cramping, aching and heavy and is located in the bilateral lumbar spine  . Pain is intermittent and lasts hours. The  pain is nonradiating. The patient rates her pain a 7 out of ten and interferes  with activities of daily living a 6 out of ten. Pain is exacerbated by prolonged standing, and is improved by rest. Patient reports prior trauma, no prior spinal surgery     - pertinent negatives: No fever, No chills, No weight loss, No bladder dysfunction, No bowel dysfunction, No saddle anesthesia  - pertinent positives: generalized nonspecific Lower Extremity weakness bilaterally    - medications, other therapies tried (physical therapy, injections):     >> NSAIDs, Tylenol, Tramadol, Norco, Percocet, morphine, gabapentin and flexeril    >> Has previously undergone Physical Therapy    >>  Injections:     - bilateral knee injection on 10/21/21 with 95% pain relief -- pain started to return about 3 weeks post for left knee, but still reporting pain relief 3 months post      Imaging / Labs / Studies (reviewed on 8/15/2024):  Results for orders placed during the hospital encounter of 02/05/19   X-Ray Lumbar Complete With Flex And Ext    Narrative COMPARISON:  None.  FINDINGS:  There is mild rightward convexity of the lumbar spine.  Transitional lumbosacral anatomy noted with partial sacralization of L5.  Vertebral body heights are well maintained.  There is slight grade 1 anterolisthesis of L4 on L5 which appears minimally worsened with flexion.  There is mild-to-moderate disc height loss throughout the lumbar spine with relative sparing of L3-4.  Prominent multilevel degenerative endplate spurring noted.  Multilevel facet arthropathy is present in the lower lumbar spine.  No pars defects visualized.  Posterior elements appear grossly intact. No acute fractures demonstrated.       Review of Systems:  CONSTITUTIONAL: patient denies any fever, chills, or weight loss  SKIN: patient denies any rash or itching  RESPIRATORY: patient denies having any shortness of breath  GASTROINTESTINAL: patient denies having any diarrhea, constipation, or bowel incontinence  GENITOURINARY: patient denies having any abnormal bladder  "function    MUSCULOSKELETAL:  - patient complains of the above noted pain/s (see chief pain complaint)    NEUROLOGICAL:   - pain as above  - strength in Lower extremities is intact, BILATERALLY  - sensation in Lower extremities is intact, BILATERALLY  - patient denies any loss of bowel or bladder control      PSYCHIATRIC: patient denies any change in mood    Other:  All other systems reviewed and are negative      Physical Exam:  Telemedicine Exam  Vitals:    08/15/24 1455   Weight: (!) 147.9 kg (326 lb)   Height: 5' 2" (1.575 m)  Comment: pt reported   Body mass index is 59.63 kg/m².   (reviewed on 8/15/2024)     GENERAL: Well appearing, in no acute distress, alert and oriented x3.   PSYCH:  Mood and affect appropriate.  SKIN: Skin color & texture with no obvious abnormalities.    HEAD/FACE:  Normocephalic, atraumatic.    PULM:  No difficulty breathing.  No audible wheezing.  EXTREMITIES: No obvious deformities.   MUSCULOSKELETAL: No obvious atrophy abnormalities are noted.   NEURO: No obvious neurologic deficit.   GAIT: sitting.     Physical Exam: last in clinic visit:  General: Alert and oriented, in no apparent distress.  Gait: antalgic gait.  Skin: No rashes, No discoloration, No obvious lesions  HEENT: Normocephalic, atraumatic. Pupils equal and round.  Cardiovascular: Regular rate and rhythm , no significant peripheral edema present  Respiratory: Without audible wheezing, without use of accessory muscles of respiration.    Musculoskeletal:  Lumbar Spine  - Pain on flexion of lumbar spine Absent  - Straight Leg Raise:  Absent  - Pain on extension of lumbar spine Present  - TTP over the lumbar facet joints Present L5-S1   - Lumbar facet loading Present  -Pain on palpation over the SI joint  Absent  - CEM: Absent      Neuro:  Strength:  LE R/L: HF: 5/5, HE: 5/5, KF: 5/5; KE: 5/5; FE: 5/5; FF: 5/5  Extremity Reflexes: Brisk and symmetric throughout.  Extremity Sensory: Sensation to pinprick and temperature " symmetric. Proprioception intact.  Psych:  Mood and affect is appropriate      Assessment:  Cee Mora is a 59 y.o. year old female who is presenting with       ICD-10-CM ICD-9-CM    1. Diabetic polyneuropathy associated with type 2 diabetes mellitus  E11.42 250.60 gabapentin (NEURONTIN) 300 MG capsule     357.2       2. Chronic pain of both knees  M25.561 719.46     M25.562 338.29     G89.29        3. Primary osteoarthritis of both knees  M17.0 715.16       4. Lumbar spondylosis  M47.816 721.3       5. Muscle pain  M79.10 729.1 cyclobenzaprine (FLEXERIL) 10 MG tablet            Plan:  1. Interventional:   - Consider QUTENZA® (capsaicin) 8% topical system for diabetic peripheral neuropathy (DPN) of the feet.  - S/p bilateral knee injection on 10/21/21 with 95% pain relief -- pain started to return about 3 weeks post for left knee, but still reporting pain relief 3 months post.      2. Pharmacologic:   - Increase gabapentin (Neurontin) 300mg BID to TID.   - Start Flexeril (cyclobenzaprine) 10mg TID PRN muscle spasms (or can take 1/2 tablet). Patient understands this may cause drowsiness.   - Continue compound cream (could be varying components includin.5% nabumetone, 2.5% gabapentin, 2.5% lidocaine, 2.5% prilocaine - depending on insurance coverage) for potential topical pain relief. Patent will be contacted by [facility] regarding cost and payment.    - D/c ibuprofen 600mg up TID PRN - from PCP.   Now on Celebrex 100mg PRN from PCP.     - [place]  reviewed and appropriate.       3. Rehabilitative: Encouraged regular exercise. Continue exercises and activities as tolerated.     4. Diagnostic/ Imaging: No new imaging ordered. Previous imaging reviewed.     5. Consult/ Referral: Consider follow-up with Bariatrics/ Gen Surgery regarding hernia repair and consideration of 2nd part of gastric sleeve.     6. Follow up: 2 months for medication Refill - virtual visit     Future Appointments   Date  Time Provider Department [place]   10/18/2024  8:40 AM Ebony Gonzalez PA-C [facility]       - Direct patient care provided: 14 minutes+. Over 50% of the time was spent counseling/educating the patient. Total time spent on patient care including prep time reviewing the chart before the visit, time spent with patient during the visit, and the time spent after the visit reviewing studies, documenting note, obtaining information from collaborative providers, etc.: >40 minutes.     - This condition does not require this patient to take time off of work, and the primary goal of our Pain Management services is to improve the patient's functional capacity.   - I discussed the risks, benefits, and alternatives to potential treatment options. All questions and concerns were fully addressed today in clinic.         Ebony Gonzalez PA-C  Interventional Pain Management - [facility]    Disclaimer:  This note was prepared using voice recognition system and is likely to have sound alike errors that may have been overlooked even after proof reading.  Please call me with any questions.

## 2024-08-15 ENCOUNTER — OFFICE VISIT (OUTPATIENT)
Dept: PAIN MEDICINE | Facility: CLINIC | Age: 59
End: 2024-08-15
Payer: MEDICAID

## 2024-08-15 VITALS — WEIGHT: 293 LBS | BODY MASS INDEX: 53.92 KG/M2 | HEIGHT: 62 IN

## 2024-08-15 DIAGNOSIS — M25.561 CHRONIC PAIN OF BOTH KNEES: ICD-10-CM

## 2024-08-15 DIAGNOSIS — G89.29 CHRONIC PAIN OF BOTH KNEES: ICD-10-CM

## 2024-08-15 DIAGNOSIS — M47.816 LUMBAR SPONDYLOSIS: ICD-10-CM

## 2024-08-15 DIAGNOSIS — E11.42 DIABETIC POLYNEUROPATHY ASSOCIATED WITH TYPE 2 DIABETES MELLITUS: Primary | ICD-10-CM

## 2024-08-15 DIAGNOSIS — M17.0 PRIMARY OSTEOARTHRITIS OF BOTH KNEES: ICD-10-CM

## 2024-08-15 DIAGNOSIS — M25.562 CHRONIC PAIN OF BOTH KNEES: ICD-10-CM

## 2024-08-15 DIAGNOSIS — M79.10 MUSCLE PAIN: ICD-10-CM

## 2024-08-15 PROCEDURE — 1159F MED LIST DOCD IN RCRD: CPT | Mod: CPTII,95,, | Performed by: PHYSICIAN ASSISTANT

## 2024-08-15 PROCEDURE — 4010F ACE/ARB THERAPY RXD/TAKEN: CPT | Mod: CPTII,95,, | Performed by: PHYSICIAN ASSISTANT

## 2024-08-15 PROCEDURE — 3044F HG A1C LEVEL LT 7.0%: CPT | Mod: CPTII,95,, | Performed by: PHYSICIAN ASSISTANT

## 2024-08-15 PROCEDURE — 99215 OFFICE O/P EST HI 40 MIN: CPT | Mod: 95,,, | Performed by: PHYSICIAN ASSISTANT

## 2024-08-15 PROCEDURE — 1160F RVW MEDS BY RX/DR IN RCRD: CPT | Mod: CPTII,95,, | Performed by: PHYSICIAN ASSISTANT

## 2024-08-15 PROCEDURE — 3008F BODY MASS INDEX DOCD: CPT | Mod: CPTII,95,, | Performed by: PHYSICIAN ASSISTANT

## 2024-08-15 RX ORDER — GABAPENTIN 300 MG/1
300 CAPSULE ORAL 3 TIMES DAILY
Qty: 90 CAPSULE | Refills: 1 | Status: SHIPPED | OUTPATIENT
Start: 2024-08-15

## 2024-08-15 RX ORDER — CYCLOBENZAPRINE HCL 10 MG
5-10 TABLET ORAL 3 TIMES DAILY PRN
Qty: 90 TABLET | Refills: 1 | Status: SHIPPED | OUTPATIENT
Start: 2024-08-15

## 2024-09-06 ENCOUNTER — PATIENT MESSAGE (OUTPATIENT)
Dept: PAIN MEDICINE | Facility: CLINIC | Age: 59
End: 2024-09-06

## 2024-09-06 ENCOUNTER — OFFICE VISIT (OUTPATIENT)
Dept: PAIN MEDICINE | Facility: CLINIC | Age: 59
End: 2024-09-06
Payer: MEDICAID

## 2024-09-06 VITALS — BODY MASS INDEX: 53.92 KG/M2 | WEIGHT: 293 LBS | HEIGHT: 62 IN

## 2024-09-06 DIAGNOSIS — M25.561 CHRONIC PAIN OF BOTH KNEES: ICD-10-CM

## 2024-09-06 DIAGNOSIS — M25.562 CHRONIC PAIN OF BOTH KNEES: ICD-10-CM

## 2024-09-06 DIAGNOSIS — M47.816 LUMBAR SPONDYLOSIS: ICD-10-CM

## 2024-09-06 DIAGNOSIS — E11.42 DIABETIC POLYNEUROPATHY ASSOCIATED WITH TYPE 2 DIABETES MELLITUS: Primary | ICD-10-CM

## 2024-09-06 DIAGNOSIS — M17.0 PRIMARY OSTEOARTHRITIS OF BOTH KNEES: ICD-10-CM

## 2024-09-06 DIAGNOSIS — G89.29 CHRONIC PAIN OF BOTH KNEES: ICD-10-CM

## 2024-09-06 DIAGNOSIS — M79.10 MUSCLE PAIN: ICD-10-CM

## 2024-09-06 RX ORDER — GABAPENTIN 300 MG/1
300 CAPSULE ORAL 4 TIMES DAILY
Qty: 120 CAPSULE | Refills: 1 | Status: SHIPPED | OUTPATIENT
Start: 2024-09-06

## 2024-09-06 RX ORDER — CYCLOBENZAPRINE HCL 10 MG
5-10 TABLET ORAL 4 TIMES DAILY PRN
Qty: 120 TABLET | Refills: 1 | Status: SHIPPED | OUTPATIENT
Start: 2024-09-06

## 2024-09-06 NOTE — PROGRESS NOTES
Established Patient - TeleHealth Visit    The patient location is: [place]- car  The chief complaint leading to consultation is: chronic pain     Visit type: audiovisual    Face to Face time with patient: 10-15 minutes  20 minutes of total time spent on the encounter, which includes face to face time and non-face to face time preparing to see the patient (eg, review of tests), Obtaining and/or reviewing separately obtained history, Documenting clinical information in the electronic or other health record, Independently interpreting results (not separately reported) and communicating results to the patient/family/caregiver, or Care coordination (not separately reported).     Each patient to whom he or she provides medical services by telemedicine is:  (1) informed of the relationship between the physician and patient and the respective role of any other health care provider with respect to management of the patient; and (2) notified that he or she may decline to receive medical services by telemedicine and may withdraw from such care at any time.      Chronic Pain -- Established Patient (Follow-up visit)  Chief Pain Complaint:  Chief Complaint   Patient presents with    Follow-up     Knee pain, L>R    Interval History (9/6/2024): Patient was last seen about 3 weeks ago, on 8/15/2024. she presents today for follow-up for medication refill. she feels the pain medication is providing adequate pain relief and reduces the negative effects of chronic pain that affects quality of life, but she feels that she would benefit from increasing gabapentin and muscle relaxer. No major SE from medications.   Patient reports pain as 6/10 today.   She has been swimming, which has been helping.    Interval History (8/15/2024): Cee RADHA Mora was last seen about 8-months ago.  She presents today reporting that she has lost a lot of weight. She has not been taking gabapentin because she had to crush the pills because she couldn't swallow  them. She is now on gabapentin (Neurontin) 300mg BID, which she would like to Increase to TID; she doesn't want to go back to original dose of gabapentin (Neurontin) 600mg QID though.  When she was released after stomach surgery, she was given Flexeril 10mg, which has been helping - better than Robaxin (methocarbamol).  She continues to lose weight. Her weight is down to 326#.  She will join a pool exercise club next week. She is still in the wheelchair and is hoping to be out of it soon. She is no longer on eliquis post op. She is trying to take less ibuprofen, and PCP gave her Celebrex to take instead so she could take less ibuprofen. She is off of hypertension medications. She also is taking less diabetic medications.             Interval History (12/1/2023): Patient was last seen on 6/29/2023. she presents today for follow-up for medication refill. she feels the gabapentin (Neurontin) is providing adequate pain relief and reduces the negative effects of chronic pain that affects quality of life. No major SE from medications. Colder weather Increases pain. Patient reports pain as 5/10 today.     Interval History (6/29/2023):  Cee RADHA Mora presents today for follow-up visit.  Patient was last seen about 6 months ago. she feels the medication is providing adequate pain relief and reduces the negative effects of chronic pain that affects quality of life. No major SE from medications. No major changes since previous visit; patient is stable overall. Patient reports pain as 7/10 today. She had some dental work recently, and she was taking ibuprofen 800mg TID, which helped with pain.    Interval History (2/3/2023):  patient presents today for follow-up visit.  Patient was last seen on 8/10/2022.  she feels the medication is providing adequate pain relief and reduces the negative effects of chronic pain that affects quality of life. No major SE from medications. No major changes since previous visit; patient is  "stable overall. Patient reports pain as 4/10 today. Knee pain is starting to return. She is using cream for knee and shoulder pain, which is helping. She is not ready to repeat knee injection yet. She states she is back on "diet pill" - Adipex. She will follow-up with GI next month. She continues to try to lose weight. Current weight around 480#.    Interval History (8/10/2022):  Cee Mora presents today for follow-up visit.  Patient was last seen about 6 months ago. Patient reports pain as 5/10 today. Left knee started to bother her, but not to level that it was before. Seeing GI now due to potential gastroparesis - diagnosed in ER. She is awaiting gastric sleeve but has to get this taken care of before. She has lost 100 lb since this journey.    Interval History (2/4/2022): Cee Mora presents today for follow-up visit.  she underwent bilateral knee injection on 10/21/21.  The patient reports that she is/was better following the procedure.  she reports 95% pain relief.  The changes lasted 3 weeks for left knee pain, but still better than it was prior to injection.  The changes have continued through this visit.  Patient reports pain as 6/10 today. Overall, pain significantly improved.     Interval History (8/4/2021):  Cee Mora presents today on telemedicine visit.  She was seen about 6 months ago.  Her knee pain has been worsening lately. She has never had prior treatment for knee pain.  She saw a dietician on 7/19/21, and she has been eating much healthier lately. Patient reports pain as 6/10 today.    Interval History (1/28/2021): Patient was last seen 6 months ago. She reports her low back pain has been more bothersome lately, which she feels is due to weather change. She has continued to try to lose weight and is hoping to have bariatric surgery soon.  She has gone down from 535 to 389lb in 1.5-2 years.   Her BMI has reduced from 99 --> 71. Patient reports pain as 6/10 today.  Gabapentin " is providing adequate pain relief.     History of Present Illness:   Cee Mora is a 59 y.o. female  who is presenting with a chief complaint of Back Pain. The patient began experiencing this problem insidiously, and the pain has been gradually worsening. The pain is described as throbbing, cramping, aching and heavy and is located in the bilateral lumbar spine  . Pain is intermittent and lasts hours. The  pain is nonradiating. The patient rates her pain a 7 out of ten and interferes with activities of daily living a 6 out of ten. Pain is exacerbated by prolonged standing, and is improved by rest. Patient reports prior trauma, no prior spinal surgery     - pertinent negatives: No fever, No chills, No weight loss, No bladder dysfunction, No bowel dysfunction, No saddle anesthesia  - pertinent positives: generalized nonspecific Lower Extremity weakness bilaterally    - medications, other therapies tried (physical therapy, injections):     >> NSAIDs, Tylenol, Tramadol, Norco, Percocet, morphine, gabapentin and flexeril    >> Has previously undergone Physical Therapy    >>  Injections:     - bilateral knee injection on 10/21/21 with 95% pain relief -- pain started to return about 3 weeks post for left knee, but still reporting pain relief 3 months post      Imaging/ Diagnostic Studies/ Labs (Reviewed on 9/6/2024):    Results for orders placed during the hospital encounter of 02/05/19   X-Ray Lumbar Complete With Flex And Ext    Narrative COMPARISON:  None.  FINDINGS:  There is mild rightward convexity of the lumbar spine.  Transitional lumbosacral anatomy noted with partial sacralization of L5.  Vertebral body heights are well maintained.  There is slight grade 1 anterolisthesis of L4 on L5 which appears minimally worsened with flexion.  There is mild-to-moderate disc height loss throughout the lumbar spine with relative sparing of L3-4.  Prominent multilevel degenerative endplate spurring noted.  Multilevel facet  "arthropathy is present in the lower lumbar spine.  No pars defects visualized.  Posterior elements appear grossly intact. No acute fractures demonstrated.       Review of Systems:  CONSTITUTIONAL: patient denies any fever, chills, or weight loss  SKIN: patient denies any rash or itching  RESPIRATORY: patient denies having any shortness of breath  GASTROINTESTINAL: patient denies having any diarrhea, constipation, or bowel incontinence  GENITOURINARY: patient denies having any abnormal bladder function    MUSCULOSKELETAL:  - patient complains of the above noted pain/s (see chief pain complaint)    NEUROLOGICAL:   - pain as above  - strength in Lower extremities is intact, BILATERALLY  - sensation in Lower extremities is intact, BILATERALLY  - patient denies any loss of bowel or bladder control      PSYCHIATRIC: patient denies any change in mood    Other:  All other systems reviewed and are negative        Telemedicine Exam  Vitals:    09/06/24 0915   Weight: (!) 145.4 kg (320 lb 9.6 oz)  Comment: pt reported   Height: 5' 2" (1.575 m)  Comment: pt reported   Body mass index is 58.64 kg/m².   (reviewed on 9/6/2024)     GENERAL: Well appearing, in no acute distress, alert and oriented x3.   PSYCH:  Mood and affect appropriate.  SKIN: Skin color & texture with no obvious abnormalities.    HEAD/FACE:  Normocephalic, atraumatic.    PULM:  No difficulty breathing.   EXTREMITIES: No obvious deformities.   MUSCULOSKELETAL: No obvious atrophy abnormalities are noted.   NEURO: No obvious neurologic deficit.      Physical Exam: last in clinic visit:  General: Alert and oriented, in no apparent distress.  Gait: antalgic gait.  Skin: No rashes, No discoloration, No obvious lesions  HEENT: Normocephalic, atraumatic. Pupils equal and round.  Cardiovascular: Regular rate and rhythm , no significant peripheral edema present  Respiratory: Without audible wheezing, without use of accessory muscles of " respiration.    Musculoskeletal:  Lumbar Spine  - Pain on flexion of lumbar spine Absent  - Straight Leg Raise:  Absent  - Pain on extension of lumbar spine Present  - TTP over the lumbar facet joints Present L5-S1   - Lumbar facet loading Present  -Pain on palpation over the SI joint  Absent  - CEM: Absent    Neuro:  Strength:  LE R/L: HF: 5/5, HE: 5/5, KF: 5/5; KE: 5/5; FE: 5/5; FF: 5/5  Extremity Reflexes: Brisk and symmetric throughout.  Extremity Sensory: Sensation to pinprick and temperature symmetric. Proprioception intact.  Psych:  Mood and affect is appropriate          Assessment:  Cee Mora is a 59 y.o. year old female who is presenting with       ICD-10-CM ICD-9-CM    1. Diabetic polyneuropathy associated with type 2 diabetes mellitus  E11.42 250.60 gabapentin (NEURONTIN) 300 MG capsule     357.2       2. Chronic pain of both knees  M25.561 719.46     M25.562 338.29     G89.29        3. Primary osteoarthritis of both knees  M17.0 715.16       4. Lumbar spondylosis  M47.816 721.3       5. Muscle pain  M79.10 729.1 cyclobenzaprine (FLEXERIL) 10 MG tablet            Plan:  1. Interventional:   - Consider QUTENZA® (capsaicin) 8% topical system for diabetic peripheral neuropathy (DPN) of the feet.  - S/p bilateral knee injection on 10/21/21 with 95% pain relief -- pain started to return about 3 weeks post for left knee, but still reporting pain relief 3 months post.      2. Pharmacologic:   - Increase gabapentin (Neurontin) 300mg TID to QID.   - Increase Flexeril (cyclobenzaprine) 10mg TID-QID PRN muscle spasms (or can take 1/2 tablet). Patient understands this may cause drowsiness.   - Continue compound cream (could be varying components includin.5% nabumetone, 2.5% gabapentin, 2.5% lidocaine, 2.5% prilocaine - depending on insurance coverage) for potential topical pain relief. Patent will be contacted by [facility] regarding cost and payment.    - Continue Celebrex 100mg PRN  from PCP.     - [place]  reviewed and appropriate.       3. Rehabilitative: Encouraged regular exercise. Continue exercises and activities as tolerated. Continue weight loss journey and water exercises.    4. Diagnostic/ Imaging: No new imaging ordered. Previous imaging reviewed.     5. Consult/ Referral: Consider follow-up with Bariatrics/ Gen Surgery regarding hernia repair and consideration of 2nd part of gastric sleeve.     6. Follow up: medication Refill - virtual visit     Future Appointments   Date Time Provider Department Center   10/18/2024  8:40 AM Ebony Gonzalez PA-C [facility]       - This condition does not require this patient to take time off of work, and the primary goal of our Pain Management services is to improve the patient's functional capacity.   - I discussed the risks, benefits, and alternatives to potential treatment options. All questions and concerns were fully addressed today in clinic.         Ebony Gonzalez PA-C  Interventional Pain Management - [facility]    Disclaimer:  This note was prepared using voice recognition system and is likely to have sound alike errors that may have been overlooked even after proof reading.  Please call me with any questions.

## 2024-10-17 RX ORDER — CYCLOBENZAPRINE HCL 10 MG
5-10 TABLET ORAL 4 TIMES DAILY PRN
Qty: 120 TABLET | Refills: 1 | Status: CANCELLED | OUTPATIENT
Start: 2024-10-17

## 2024-10-17 RX ORDER — GABAPENTIN 300 MG/1
300 CAPSULE ORAL 4 TIMES DAILY
Qty: 120 CAPSULE | Refills: 1 | Status: CANCELLED | OUTPATIENT
Start: 2024-10-17

## 2024-10-17 NOTE — PROGRESS NOTES
Established Patient - TeleHealth Visit    The patient location is: [place]- car  The chief complaint leading to consultation is: chronic pain     Visit type: audiovisual    Face to Face time with patient: 10-15 minutes  20 minutes of total time spent on the encounter, which includes face to face time and non-face to face time preparing to see the patient (eg, review of tests), Obtaining and/or reviewing separately obtained history, Documenting clinical information in the electronic or other health record, Independently interpreting results (not separately reported) and communicating results to the patient/family/caregiver, or Care coordination (not separately reported).     Each patient to whom he or she provides medical services by telemedicine is:  (1) informed of the relationship between the physician and patient and the respective role of any other health care provider with respect to management of the patient; and (2) notified that he or she may decline to receive medical services by telemedicine and may withdraw from such care at any time.      Chronic Pain -- Established Patient (Follow-up visit)  Chief Pain Complaint:  Chief Complaint   Patient presents with    Follow-up     Knee pain, L>R    Interval History (10/18/2024):  Cee RADHA Mora presents today for follow-up visit.  Patient was last seen on 9/6/2024. At that visit, the plan was to increase gabapentin capsule 300mg to QID. Patient reports pain as 4/10 today.    Gabapentin (Neurontin) 300mg BID       Interval History (9/6/2024): Patient was last seen about 3 weeks ago, on 8/15/2024. she presents today for follow-up for medication refill. she feels the pain medication is providing adequate pain relief and reduces the negative effects of chronic pain that affects quality of life, but she feels that she would benefit from increasing gabapentin and muscle relaxer. No major SE from medications.   Patient reports pain as 6/10 today.   She has been swimming,  which has been helping.    Interval History (8/15/2024): Cee Mora was last seen about 8-months ago.  She presents today reporting that she has lost a lot of weight. She has not been taking gabapentin because she had to crush the pills because she couldn't swallow them. She is now on gabapentin (Neurontin) 300mg BID, which she would like to Increase to TID; she doesn't want to go back to original dose of gabapentin (Neurontin) 600mg QID though.  When she was released after stomach surgery, she was given Flexeril 10mg, which has been helping - better than Robaxin (methocarbamol).  She continues to lose weight. Her weight is down to 326#.  She will join a pool exercise club next week. She is still in the wheelchair and is hoping to be out of it soon. She is no longer on eliquis post op. She is trying to take less ibuprofen, and PCP gave her Celebrex to take instead so she could take less ibuprofen. She is off of hypertension medications. She also is taking less diabetic medications.             Interval History (12/1/2023): Patient was last seen on 6/29/2023. she presents today for follow-up for medication refill. she feels the gabapentin (Neurontin) is providing adequate pain relief and reduces the negative effects of chronic pain that affects quality of life. No major SE from medications. Colder weather Increases pain. Patient reports pain as 5/10 today.     Interval History (6/29/2023):  Cee Mora presents today for follow-up visit.  Patient was last seen about 6 months ago. she feels the medication is providing adequate pain relief and reduces the negative effects of chronic pain that affects quality of life. No major SE from medications. No major changes since previous visit; patient is stable overall. Patient reports pain as 7/10 today. She had some dental work recently, and she was taking ibuprofen 800mg TID, which helped with pain.    Interval History (2/3/2023):  patient presents today for  "follow-up visit.  Patient was last seen on 8/10/2022.  she feels the medication is providing adequate pain relief and reduces the negative effects of chronic pain that affects quality of life. No major SE from medications. No major changes since previous visit; patient is stable overall. Patient reports pain as 4/10 today. Knee pain is starting to return. She is using cream for knee and shoulder pain, which is helping. She is not ready to repeat knee injection yet. She states she is back on "diet pill" - Adipex. She will follow-up with GI next month. She continues to try to lose weight. Current weight around 480#.    Interval History (8/10/2022):  Cee Mora presents today for follow-up visit.  Patient was last seen about 6 months ago. Patient reports pain as 5/10 today. Left knee started to bother her, but not to level that it was before. Seeing GI now due to potential gastroparesis - diagnosed in ER. She is awaiting gastric sleeve but has to get this taken care of before. She has lost 100 lb since this journey.    Interval History (2/4/2022): Cee Mora presents today for follow-up visit.  she underwent bilateral knee injection on 10/21/21.  The patient reports that she is/was better following the procedure.  she reports 95% pain relief.  The changes lasted 3 weeks for left knee pain, but still better than it was prior to injection.  The changes have continued through this visit.  Patient reports pain as 6/10 today. Overall, pain significantly improved.     Interval History (8/4/2021):  Cee Mora presents today on telemedicine visit.  She was seen about 6 months ago.  Her knee pain has been worsening lately. She has never had prior treatment for knee pain.  She saw a dietician on 7/19/21, and she has been eating much healthier lately. Patient reports pain as 6/10 today.    Interval History (1/28/2021): Patient was last seen 6 months ago. She reports her low back pain has been more bothersome " lately, which she feels is due to weather change. She has continued to try to lose weight and is hoping to have bariatric surgery soon.  She has gone down from 535 to 389lb in 1.5-2 years.   Her BMI has reduced from 99 --> 71. Patient reports pain as 6/10 today.  Gabapentin is providing adequate pain relief.     History of Present Illness:   Cee Mora is a 59 y.o. female  who is presenting with a chief complaint of Back Pain. The patient began experiencing this problem insidiously, and the pain has been gradually worsening. The pain is described as throbbing, cramping, aching and heavy and is located in the bilateral lumbar spine  . Pain is intermittent and lasts hours. The  pain is nonradiating. The patient rates her pain a 7 out of ten and interferes with activities of daily living a 6 out of ten. Pain is exacerbated by prolonged standing, and is improved by rest. Patient reports prior trauma, no prior spinal surgery     - pertinent negatives: No fever, No chills, No weight loss, No bladder dysfunction, No bowel dysfunction, No saddle anesthesia  - pertinent positives: generalized nonspecific Lower Extremity weakness bilaterally    - medications, other therapies tried (physical therapy, injections):     >> NSAIDs, Tylenol, Tramadol, Norco, Percocet, morphine, gabapentin and flexeril    >> Has previously undergone Physical Therapy    >>  Injections:     - bilateral knee injection on 10/21/21 with 95% pain relief -- pain started to return about 3 weeks post for left knee, but still reporting pain relief 3 months post      Imaging/ Diagnostic Studies/ Labs (Reviewed on 10/18/2024):    Results for orders placed during the hospital encounter of 02/05/19   X-Ray Lumbar Complete With Flex And Ext    Narrative COMPARISON:  None.  FINDINGS:  There is mild rightward convexity of the lumbar spine.  Transitional lumbosacral anatomy noted with partial sacralization of L5.  Vertebral body heights are well maintained.   "There is slight grade 1 anterolisthesis of L4 on L5 which appears minimally worsened with flexion.  There is mild-to-moderate disc height loss throughout the lumbar spine with relative sparing of L3-4.  Prominent multilevel degenerative endplate spurring noted.  Multilevel facet arthropathy is present in the lower lumbar spine.  No pars defects visualized.  Posterior elements appear grossly intact. No acute fractures demonstrated.       Review of Systems:  CONSTITUTIONAL: patient denies any fever, chills, or weight loss  SKIN: patient denies any rash or itching  RESPIRATORY: patient denies having any shortness of breath  GASTROINTESTINAL: patient denies having any diarrhea, constipation, or bowel incontinence  GENITOURINARY: patient denies having any abnormal bladder function    MUSCULOSKELETAL:  - patient complains of the above noted pain/s (see chief pain complaint)    NEUROLOGICAL:   - pain as above  - strength in Lower extremities is intact, BILATERALLY  - sensation in Lower extremities is intact, BILATERALLY  - patient denies any loss of bowel or bladder control      PSYCHIATRIC: patient denies any change in mood    Other:  All other systems reviewed and are negative        Telemedicine Physical Exam:   Vitals:    10/18/24 0837   Height: 5' 2" (1.575 m)  Comment: pt reported     Body mass index is 58.64 kg/m².   (reviewed on 10/18/2024)     GENERAL: Well appearing, in no acute distress, alert and oriented x3.  Cooperative.  PSYCH:  Mood and affect appropriate.  SKIN: Skin color & texture with no obvious abnormalities.    HEAD/FACE:  Normocephalic, atraumatic.    PULM:  No difficulty breathing. No nasal flaring. No obvious wheezing.  EXTREMITIES: No obvious deformities. Moving all extremities well, appears to have symmetric strength throughout.  MUSCULOSKELETAL: No obvious atrophy abnormalities are noted.   NEURO: No obvious neurologic deficit.   GAIT: sitting.     Physical Exam: last in clinic visit:  General: " Alert and oriented, in no apparent distress.  Gait: antalgic gait.  Skin: No rashes, No discoloration, No obvious lesions  HEENT: Normocephalic, atraumatic. Pupils equal and round.  Cardiovascular: Regular rate and rhythm , no significant peripheral edema present  Respiratory: Without audible wheezing, without use of accessory muscles of respiration.    Musculoskeletal:  Lumbar Spine  - Pain on flexion of lumbar spine Absent  - Straight Leg Raise:  Absent  - Pain on extension of lumbar spine Present  - TTP over the lumbar facet joints Present L5-S1   - Lumbar facet loading Present  -Pain on palpation over the SI joint  Absent  - CEM: Absent    Neuro:  Strength:  LE R/L: HF: 5/5, HE: 5/5, KF: 5/5; KE: 5/5; FE: 5/5; FF: 5/5  Extremity Reflexes: Brisk and symmetric throughout.  Extremity Sensory: Sensation to pinprick and temperature symmetric. Proprioception intact.  Psych:  Mood and affect is appropriate          Assessment:  Cee Mora is a 59 y.o. year old female who is presenting with       ICD-10-CM ICD-9-CM    1. Diabetic polyneuropathy associated with type 2 diabetes mellitus  E11.42 250.60      357.2       2. Chronic pain of both knees  M25.561 719.46     M25.562 338.29     G89.29        3. Primary osteoarthritis of both knees  M17.0 715.16       4. Muscle pain  M79.10 729.1       5. Lumbar spondylosis  M47.816 721.3               Plan:  1. Interventional:   - Consider QUTENZA® (capsaicin) 8% topical system for diabetic peripheral neuropathy (DPN) of the feet.  - S/p bilateral knee injection on 10/21/21 with 95% pain relief -- pain started to return about 3 weeks post for left knee, but still reporting pain relief 3 months post.      2. Pharmacologic:   - Refill gabapentin (Neurontin) 300mg QID.   - Refill Flexeril (cyclobenzaprine) 10mg TID-QID PRN muscle spasms (or can take 1/2 tablet). Patient understands this may cause drowsiness.   - Continue compound cream (could be varying components including:  2.5% nabumetone, 2.5% gabapentin, 2.5% lidocaine, 2.5% prilocaine - depending on insurance coverage) for potential topical pain relief. Patent will be contacted by [facility] regarding cost and payment.    - Continue Celebrex 100mg PRN -  from PCP.     - [place]  reviewed and appropriate.       3. Rehabilitative: Encouraged regular exercise. Continue exercises and activities as tolerated. Continue weight loss journey and water exercises.    4. Diagnostic/ Imaging: No new imaging ordered. Previous imaging reviewed.     5. Consult/ Referral: Consider follow-up with Bariatrics/ Gen Surgery regarding hernia repair and consideration of 2nd part of gastric sleeve.     6. Follow up: medication Refill - virtual visit     Future Appointments   Date Time Provider Department Center   10/18/2024  8:40 AM Ebony Gonzalez PA-C [facility]       - This condition does not require this patient to take time off of work, and the primary goal of our Pain Management services is to improve the patient's functional capacity.   - I discussed the risks, benefits, and alternatives to potential treatment options. All questions and concerns were fully addressed today in clinic.         Ebony Gonzalez PA-C  Interventional Pain Management - [facility]    Disclaimer:  This note was prepared using voice recognition system and is likely to have sound alike errors that may have been overlooked even after proof reading.  Please call me with any questions.

## 2024-10-18 ENCOUNTER — OFFICE VISIT (OUTPATIENT)
Dept: PAIN MEDICINE | Facility: CLINIC | Age: 59
End: 2024-10-18
Payer: MEDICAID

## 2024-10-18 VITALS — HEIGHT: 62 IN | BODY MASS INDEX: 53.92 KG/M2 | WEIGHT: 293 LBS

## 2024-10-18 DIAGNOSIS — E11.42 DIABETIC POLYNEUROPATHY ASSOCIATED WITH TYPE 2 DIABETES MELLITUS: Primary | ICD-10-CM

## 2024-10-18 DIAGNOSIS — M25.561 CHRONIC PAIN OF BOTH KNEES: ICD-10-CM

## 2024-10-18 DIAGNOSIS — M17.0 PRIMARY OSTEOARTHRITIS OF BOTH KNEES: ICD-10-CM

## 2024-10-18 DIAGNOSIS — M47.816 LUMBAR SPONDYLOSIS: ICD-10-CM

## 2024-10-18 DIAGNOSIS — G89.29 CHRONIC PAIN OF BOTH KNEES: ICD-10-CM

## 2024-10-18 DIAGNOSIS — M25.562 CHRONIC PAIN OF BOTH KNEES: ICD-10-CM

## 2024-10-18 DIAGNOSIS — M79.10 MUSCLE PAIN: ICD-10-CM

## 2024-11-18 DIAGNOSIS — E11.42 DIABETIC POLYNEUROPATHY ASSOCIATED WITH TYPE 2 DIABETES MELLITUS: ICD-10-CM

## 2024-11-18 RX ORDER — GABAPENTIN 300 MG/1
300 CAPSULE ORAL 4 TIMES DAILY
Qty: 120 CAPSULE | Refills: 2 | Status: SHIPPED | OUTPATIENT
Start: 2024-11-18

## 2024-11-19 ENCOUNTER — PATIENT MESSAGE (OUTPATIENT)
Dept: NEUROLOGY | Facility: CLINIC | Age: 59
End: 2024-11-19
Payer: MEDICAID

## 2024-12-09 DIAGNOSIS — M79.10 MUSCLE PAIN: ICD-10-CM

## 2024-12-09 RX ORDER — CYCLOBENZAPRINE HCL 10 MG
TABLET ORAL
Qty: 90 TABLET | Refills: 1 | Status: SHIPPED | OUTPATIENT
Start: 2024-12-09

## 2024-12-09 NOTE — TELEPHONE ENCOUNTER
Good Morning/Afternoon,     Pt is requesting for a refill of: Cyclobenzaprine 10 mg  Last filed: 9/6/247  Last encounter: 10/18/24  Up coming apt: N/A  Pharmacy:  [facility] - [place] - [address] [place] [address]     Is this something you can do?      Norma Blank  Medical Assistant

## 2025-01-31 DIAGNOSIS — M79.10 MUSCLE PAIN: ICD-10-CM

## 2025-01-31 RX ORDER — CYCLOBENZAPRINE HCL 10 MG
TABLET ORAL
Qty: 90 TABLET | Refills: 1 | Status: SHIPPED | OUTPATIENT
Start: 2025-01-31

## 2025-06-02 DIAGNOSIS — E11.42 DIABETIC POLYNEUROPATHY ASSOCIATED WITH TYPE 2 DIABETES MELLITUS: ICD-10-CM

## 2025-06-03 RX ORDER — GABAPENTIN 300 MG/1
300 CAPSULE ORAL 4 TIMES DAILY
Qty: 120 CAPSULE | Refills: 2 | Status: SHIPPED | OUTPATIENT
Start: 2025-06-03